# Patient Record
Sex: FEMALE | Race: WHITE | Employment: UNEMPLOYED | URBAN - METROPOLITAN AREA
[De-identification: names, ages, dates, MRNs, and addresses within clinical notes are randomized per-mention and may not be internally consistent; named-entity substitution may affect disease eponyms.]

---

## 2018-08-11 ENCOUNTER — HOSPITAL ENCOUNTER (EMERGENCY)
Age: 33
Discharge: HOME | End: 2018-08-12
Attending: EMERGENCY MEDICINE
Payer: MEDICAID

## 2018-08-11 ENCOUNTER — APPOINTMENT (OUTPATIENT)
Dept: GENERAL RADIOLOGY | Age: 33
End: 2018-08-11
Attending: EMERGENCY MEDICINE
Payer: MEDICAID

## 2018-08-11 DIAGNOSIS — S30.0XXA LUMBAR CONTUSION, INITIAL ENCOUNTER: Primary | ICD-10-CM

## 2018-08-11 PROCEDURE — 96372 THER/PROPH/DIAG INJ SC/IM: CPT | Performed by: EMERGENCY MEDICINE

## 2018-08-11 PROCEDURE — 74011250637 HC RX REV CODE- 250/637: Performed by: EMERGENCY MEDICINE

## 2018-08-11 PROCEDURE — 72100 X-RAY EXAM L-S SPINE 2/3 VWS: CPT

## 2018-08-11 PROCEDURE — 99283 EMERGENCY DEPT VISIT LOW MDM: CPT | Performed by: EMERGENCY MEDICINE

## 2018-08-11 PROCEDURE — 72220 X-RAY EXAM SACRUM TAILBONE: CPT

## 2018-08-11 PROCEDURE — 74011250636 HC RX REV CODE- 250/636: Performed by: EMERGENCY MEDICINE

## 2018-08-11 RX ORDER — KETOROLAC TROMETHAMINE 30 MG/ML
60 INJECTION, SOLUTION INTRAMUSCULAR; INTRAVENOUS
Status: COMPLETED | OUTPATIENT
Start: 2018-08-11 | End: 2018-08-11

## 2018-08-11 RX ORDER — METAXALONE 800 MG/1
800 TABLET ORAL ONCE
Status: COMPLETED | OUTPATIENT
Start: 2018-08-11 | End: 2018-08-11

## 2018-08-11 RX ADMIN — METAXALONE 800 MG: 800 TABLET ORAL at 23:53

## 2018-08-11 RX ADMIN — KETOROLAC TROMETHAMINE 60 MG: 30 INJECTION, SOLUTION INTRAMUSCULAR at 23:53

## 2018-08-12 VITALS
RESPIRATION RATE: 16 BRPM | OXYGEN SATURATION: 97 % | DIASTOLIC BLOOD PRESSURE: 74 MMHG | BODY MASS INDEX: 53.37 KG/M2 | HEIGHT: 62 IN | WEIGHT: 290 LBS | HEART RATE: 76 BPM | TEMPERATURE: 97.8 F | SYSTOLIC BLOOD PRESSURE: 136 MMHG

## 2018-08-12 RX ORDER — METHOCARBAMOL 750 MG/1
1500 TABLET, FILM COATED ORAL 4 TIMES DAILY
Qty: 40 TAB | Refills: 0 | Status: SHIPPED | OUTPATIENT
Start: 2018-08-12 | End: 2020-05-28

## 2018-08-12 RX ORDER — HYDROCODONE BITARTRATE AND ACETAMINOPHEN 7.5; 325 MG/1; MG/1
1 TABLET ORAL
Qty: 17 TAB | Refills: 0 | Status: SHIPPED | OUTPATIENT
Start: 2018-08-12 | End: 2020-05-28

## 2018-08-12 RX ORDER — NAPROXEN 500 MG/1
500 TABLET ORAL 2 TIMES DAILY WITH MEALS
Qty: 20 TAB | Refills: 0 | Status: SHIPPED | OUTPATIENT
Start: 2018-08-12 | End: 2020-05-28

## 2018-08-12 NOTE — ED PROVIDER NOTES
Patient is a 29 y.o. female presenting with fall. The history is provided by the patient. Fall   The accident occurred 2 days ago. Fall occurred: patient fell while trying to stand up from a kitchen stool. Reports that she slipped on some spilled cat food and landed directly on her lower back and coccyx region. She fell from a height of ground level. She landed on hard floor. There was no blood loss. Point of impact: llow back and buttocks. Pain location: llow back and buttocks. The pain is moderate. She was ambulatory at the scene. There was no entrapment after the fall. There was no drug use involved in the accident. There was no alcohol use involved in the accident. Pertinent negatives include no fever, no abdominal pain, no nausea, no vomiting, no headaches, no loss of consciousness and no laceration. Risk factors: oobesity. The symptoms are aggravated by activity. No past medical history on file. Past Surgical History:   Procedure Laterality Date    HX GYN      c section    HX HEENT           No family history on file. Social History     Social History    Marital status: SINGLE     Spouse name: N/A    Number of children: N/A    Years of education: N/A     Occupational History    Not on file. Social History Main Topics    Smoking status: Never Smoker    Smokeless tobacco: Not on file    Alcohol use No    Drug use: No    Sexual activity: Yes     Partners: Male     Other Topics Concern    Not on file     Social History Narrative         ALLERGIES: Morphine and Pcn [penicillins]    Review of Systems   Constitutional: Negative for chills and fever. HENT: Negative for congestion, ear pain and rhinorrhea. Eyes: Negative for photophobia and discharge. Respiratory: Negative for cough and shortness of breath. Cardiovascular: Negative for chest pain and palpitations. Gastrointestinal: Negative for abdominal pain, constipation, diarrhea, nausea and vomiting.    Endocrine: Negative for cold intolerance and heat intolerance. Genitourinary: Negative for dysuria and flank pain. Musculoskeletal: Positive for back pain. Negative for arthralgias, myalgias and neck pain. Skin: Negative for rash and wound. Allergic/Immunologic: Negative for environmental allergies and food allergies. Neurological: Negative for loss of consciousness, syncope and headaches. Hematological: Negative for adenopathy. Does not bruise/bleed easily. Psychiatric/Behavioral: Positive for dysphoric mood. The patient is nervous/anxious. All other systems reviewed and are negative. Vitals:    08/11/18 2316   BP: 139/76   Pulse: 81   Resp: 16   Temp: 97.8 °F (36.6 °C)   SpO2: 98%   Weight: 131.5 kg (290 lb)   Height: 5' 2\" (1.575 m)            Physical Exam   Constitutional: She is oriented to person, place, and time. She appears well-developed and well-nourished. She appears distressed. HENT:   Head: Normocephalic and atraumatic. Mouth/Throat: Oropharynx is clear and moist.   Eyes: Conjunctivae and EOM are normal. Pupils are equal, round, and reactive to light. Right eye exhibits no discharge. Left eye exhibits no discharge. No scleral icterus. Neck: Normal range of motion. Neck supple. No thyromegaly present. Cardiovascular: Normal rate, regular rhythm, normal heart sounds and intact distal pulses. Exam reveals no gallop and no friction rub. No murmur heard. Pulmonary/Chest: Effort normal and breath sounds normal. No respiratory distress. She has no wheezes. She exhibits no tenderness. Abdominal: Soft. Bowel sounds are normal. She exhibits no distension. There is no hepatosplenomegaly. There is no tenderness. There is no rebound and no guarding. No hernia. Musculoskeletal: Normal range of motion. She exhibits no edema or tenderness. Back:    Neurological: She is alert and oriented to person, place, and time. No cranial nerve deficit. She exhibits normal muscle tone.    Skin: Skin is warm and dry. No laceration and no rash noted. She is not diaphoretic. No erythema. Psychiatric: Her speech is normal and behavior is normal. Judgment and thought content normal. Her affect is blunt. Cognition and memory are normal.   Patient has a flat affect, is tearful   Nursing note and vitals reviewed. MDM  Number of Diagnoses or Management Options  Lumbar contusion, initial encounter: new and requires workup  Diagnosis management comments: Patient's controlled substance prescription records reviewed @ the [facility]  Aware website. Information will be utilized for accurate and appropriate patient care.     08/07/2018 2   06/05/2018 CLONAZEPAM 1 MG TABLET 60 30 JE ARACELI [id number] [facility]([id number]) 1 4.00 LME [place]   06/05/2018 2   06/05/2018 CLONAZEPAM 1 MG TABLET 60 30 JE ARACELI [id number] [facility]([id number]) 0 4.[place]   04/20/2018 2   03/16/2018 CLONAZEPAM 1 MG TABLET 60 30 KO GRA [id number] [facility]([id number]) 1 4.[place]   03/16/2018 2   03/16/2018 CLONAZEPAM 1 MG TABLET 60 30 KO GRA [id number] [facility]([id number]) 0 4.00 [place]   02/06/2018 2   12/01/2017 CLONAZEPAM 1 MG TABLET 60 30 KE CITLALI [id number] [facility]([id number]) 2 4.[place]   01/06/2018 2   12/01/2017 CLONAZEPAM 1 MG TABLET 60 30 KE CITLALI [id number] [facility]([id number]) 1 4.[place]   12/03/2017 2   12/01/2017 CLONAZEPAM 1 MG TABLET 60 30 KE CITLALI [id number] [facility]([id number]) 0 4.[place]   11/03/2017 2   08/11/2017 CLONAZEPAM 1 MG TABLET 60 30 PH STO [id number] [facility]([id number]) 2 4.[place]   10/01/2017 2   08/11/2017 CLONAZEPAM 1 MG TABLET 60 30 PH STO [id number] [facility]([id number]) 1 4.00 [place]   09/01/2017 2   08/11/2017 CLONAZEPAM 1 MG TABLET 60 30 PH STO [id number] [facility]([id number]) 0 4.00 [place]   07/29/2017 2   04/11/2017 CLONAZEPAM 1 MG TABLET 60 30 KE CITLALI [id number] [facility]([id number]) 2 4.00 [place]   06/12/2017 2   04/11/2017 CLONAZEPAM 1 MG TABLET 60 30 [facility] [id number] [facility]([id number]) 1 4.00 LME Medicaid     11:31 PM  Differential diagnosis  Vertebral fracture, contusion, muscle strength       Amount and/or Complexity of Data Reviewed  Tests in the radiology section of CPT®: ordered and reviewed  Tests in the medicine section of CPT®: reviewed and ordered  Review and summarize past medical records: yes    Risk of Complications, Morbidity, and/or Mortality  Presenting problems: moderate  Diagnostic procedures: low  Management options: low  General comments: Elements of this note have been dictated via voice recognition software. Text and phrases may be limited by the accuracy of the software. The chart has been reviewed, but errors may still be present.       Patient Progress  Patient progress: improved        ED Course       Procedures

## 2018-08-12 NOTE — ED NOTES
I have reviewed discharge instructions with the patient. The patient verbalized understanding. Patient left ED via Discharge Method: wheelchair to Home with ( family, self). Opportunity for questions and clarification provided. Patient given 3 scripts. To continue your aftercare when you leave the hospital, you may receive an automated call from our care team to check in on how you are doing. This is a free service and part of our promise to provide the best care and service to meet your aftercare needs.  If you have questions, or wish to unsubscribe from this service please call [phone]. Thank you for Choosing our [facility].

## 2018-08-12 NOTE — DISCHARGE INSTRUCTIONS
Low Back Contusion: Care Instructions  Your Care Instructions    Contusion is the medical term for a bruise. When you have a low back bruise, it's often caused by a direct blow or an impact, such as falling against a counter or table. Bruises are common sports injuries. Most people think of a bruise as a black-and-blue spot. This happens when small blood vessels get torn and leak blood under the skin. But bones, muscles, and organs can also get bruised. If these deep tissues are damaged, you may not always see a bruise. The doctor will examine your bruise. You may also have tests to make sure you do not have a more serious injury, such as a broken bone or nerve damage. Tests may include X-rays or other imaging tests like a CT scan or MRI. Low back bruises may cause pain and swelling. But if there is no serious damage, they will often get better with home treatment in several days to a few weeks. The doctor has checked you carefully, but problems can develop later. If you notice any problems or new symptoms, get medical treatment right away. Follow-up care is a key part of your treatment and safety. Be sure to make and go to all appointments, and call your doctor if you are having problems. It's also a good idea to know your test results and keep a list of the medicines you take. How can you care for yourself at home? · Put ice or a cold pack on the sore area for 10 to 20 minutes at a time to stop swelling. Put a thin cloth between the ice pack and your skin. · Be safe with medicines. Read and follow all instructions on the label. ¨ If the doctor gave you a prescription medicine for pain, take it as prescribed. ¨ If you are not taking a prescription pain medicine, ask your doctor if you can take an over-the-counter medicine. · For the first day or two of pain, take it easy. But as soon as possible, get back to your normal daily life and activities. · Get gentle exercise, such as walking.  Movement keeps your spine flexible and helps your muscles stay strong. When should you call for help? Call 911 anytime you think you may need emergency care. For example, call if:    · You are unable to move a leg at all.   [facility] your doctor now or seek immediate medical care if:    · You have new or worse symptoms in your legs or buttocks. Symptoms may include:  ¨ Numbness or tingling. ¨ Weakness. ¨ Pain.     · You lose bladder or bowel control.     · You have blood in your urine.    Watch closely for changes in your health, and be sure to contact your doctor if:    · You do not get better as expected. Where can you learn more? Go to http://genny-paras.info/. Enter V337 in the search box to learn more about \"Low Back Contusion: Care Instructions. \"  Current as of: November 20, 2017  Content Version: 11.7  © 8372-2861 [employer]. Care instructions adapted under license by [employer] (which disclaims liability or warranty for this information). If you have questions about a medical condition or this instruction, always ask your healthcare professional. [address] any warranty or liability for your use of this information.

## 2019-02-23 ENCOUNTER — HOSPITAL ENCOUNTER (EMERGENCY)
Age: 34
Discharge: HOME | End: 2019-02-23
Attending: EMERGENCY MEDICINE
Payer: MEDICAID

## 2019-02-23 ENCOUNTER — APPOINTMENT (OUTPATIENT)
Dept: CT IMAGING | Age: 34
End: 2019-02-23
Attending: EMERGENCY MEDICINE
Payer: MEDICAID

## 2019-02-23 ENCOUNTER — APPOINTMENT (OUTPATIENT)
Dept: ULTRASOUND IMAGING | Age: 34
End: 2019-02-23
Attending: EMERGENCY MEDICINE
Payer: MEDICAID

## 2019-02-23 VITALS
SYSTOLIC BLOOD PRESSURE: 145 MMHG | DIASTOLIC BLOOD PRESSURE: 95 MMHG | BODY MASS INDEX: 55.32 KG/M2 | HEIGHT: 61 IN | WEIGHT: 293 LBS | OXYGEN SATURATION: 99 % | RESPIRATION RATE: 16 BRPM | HEART RATE: 80 BPM | TEMPERATURE: 98.1 F

## 2019-02-23 DIAGNOSIS — R10.84 ABDOMINAL PAIN, GENERALIZED: Primary | ICD-10-CM

## 2019-02-23 LAB
ALBUMIN SERPL-MCNC: 3.7 G/DL (ref 3.5–5)
ALBUMIN/GLOB SERPL: 0.8 {RATIO}
ALP SERPL-CCNC: 132 U/L (ref 50–130)
ALT SERPL-CCNC: 20 U/L (ref 12–65)
ANION GAP SERPL CALC-SCNC: 6 MMOL/L
AST SERPL-CCNC: 19 U/L (ref 15–37)
BASOPHILS # BLD: 0 K/UL (ref 0–0.2)
BASOPHILS NFR BLD: 0 % (ref 0–2)
BILIRUB SERPL-MCNC: 0.4 MG/DL (ref 0.2–1.1)
BUN SERPL-MCNC: 9 MG/DL (ref 6–23)
CALCIUM SERPL-MCNC: 9 MG/DL (ref 8.3–10.4)
CHLORIDE SERPL-SCNC: 105 MMOL/L (ref 98–107)
CO2 SERPL-SCNC: 27 MMOL/L (ref 21–32)
CREAT SERPL-MCNC: 0.82 MG/DL (ref 0.6–1)
DIFFERENTIAL METHOD BLD: ABNORMAL
EOSINOPHIL # BLD: 0.1 K/UL (ref 0–0.8)
EOSINOPHIL NFR BLD: 1 % (ref 0.5–7.8)
ERYTHROCYTE [DISTWIDTH] IN BLOOD BY AUTOMATED COUNT: 15.2 % (ref 11.9–14.6)
GLOBULIN SER CALC-MCNC: 4.9 G/DL (ref 2.3–3.5)
GLUCOSE SERPL-MCNC: 106 MG/DL (ref 65–100)
HCG UR QL: NEGATIVE
HCT VFR BLD AUTO: 40.1 % (ref 35.8–46.3)
HGB BLD-MCNC: 12.4 G/DL (ref 11.7–15.4)
IMM GRANULOCYTES # BLD AUTO: 0.1 K/UL (ref 0–0.5)
IMM GRANULOCYTES NFR BLD AUTO: 1 % (ref 0–5)
LIPASE SERPL-CCNC: 139 U/L (ref 73–393)
LYMPHOCYTES # BLD: 1.8 K/UL (ref 0.5–4.6)
LYMPHOCYTES NFR BLD: 18 % (ref 13–44)
MCH RBC QN AUTO: 26.2 PG (ref 26.1–32.9)
MCHC RBC AUTO-ENTMCNC: 30.9 G/DL (ref 31.4–35)
MCV RBC AUTO: 84.6 FL (ref 79.6–97.8)
MONOCYTES # BLD: 0.5 K/UL (ref 0.1–1.3)
MONOCYTES NFR BLD: 6 % (ref 4–12)
NEUTS SEG # BLD: 7.2 K/UL (ref 1.7–8.2)
NEUTS SEG NFR BLD: 74 % (ref 43–78)
NRBC # BLD: 0 K/UL (ref 0–0.2)
PLATELET # BLD AUTO: 445 K/UL (ref 150–450)
PMV BLD AUTO: 10.2 FL (ref 9.4–12.3)
POTASSIUM SERPL-SCNC: 3.9 MMOL/L (ref 3.5–5.1)
PROT SERPL-MCNC: 8.6 G/DL
RBC # BLD AUTO: 4.74 M/UL (ref 4.05–5.2)
SODIUM SERPL-SCNC: 138 MMOL/L (ref 136–145)
WBC # BLD AUTO: 9.7 K/UL (ref 4.3–11.1)

## 2019-02-23 PROCEDURE — 74011000258 HC RX REV CODE- 258: Performed by: EMERGENCY MEDICINE

## 2019-02-23 PROCEDURE — 76856 US EXAM PELVIC COMPLETE: CPT

## 2019-02-23 PROCEDURE — 96375 TX/PRO/DX INJ NEW DRUG ADDON: CPT | Performed by: EMERGENCY MEDICINE

## 2019-02-23 PROCEDURE — 80053 COMPREHEN METABOLIC PANEL: CPT

## 2019-02-23 PROCEDURE — 99284 EMERGENCY DEPT VISIT MOD MDM: CPT | Performed by: EMERGENCY MEDICINE

## 2019-02-23 PROCEDURE — 81003 URINALYSIS AUTO W/O SCOPE: CPT | Performed by: EMERGENCY MEDICINE

## 2019-02-23 PROCEDURE — 74011250636 HC RX REV CODE- 250/636: Performed by: EMERGENCY MEDICINE

## 2019-02-23 PROCEDURE — 96374 THER/PROPH/DIAG INJ IV PUSH: CPT | Performed by: EMERGENCY MEDICINE

## 2019-02-23 PROCEDURE — 74011636320 HC RX REV CODE- 636/320: Performed by: EMERGENCY MEDICINE

## 2019-02-23 PROCEDURE — 81025 URINE PREGNANCY TEST: CPT

## 2019-02-23 PROCEDURE — 83690 ASSAY OF LIPASE: CPT

## 2019-02-23 PROCEDURE — 74177 CT ABD & PELVIS W/CONTRAST: CPT

## 2019-02-23 PROCEDURE — 85025 COMPLETE CBC W/AUTO DIFF WBC: CPT

## 2019-02-23 RX ORDER — SODIUM CHLORIDE 0.9 % (FLUSH) 0.9 %
10 SYRINGE (ML) INJECTION
Status: COMPLETED | OUTPATIENT
Start: 2019-02-23 | End: 2019-02-23

## 2019-02-23 RX ORDER — ONDANSETRON 2 MG/ML
4 INJECTION INTRAMUSCULAR; INTRAVENOUS
Status: COMPLETED | OUTPATIENT
Start: 2019-02-23 | End: 2019-02-23

## 2019-02-23 RX ORDER — POLYETHYLENE GLYCOL 3350 17 G/17G
17 POWDER, FOR SOLUTION ORAL DAILY
Qty: 17 G | Refills: 0 | Status: SHIPPED | OUTPATIENT
Start: 2019-02-23 | End: 2020-05-28

## 2019-02-23 RX ORDER — RANITIDINE 300 MG/1
300 TABLET ORAL DAILY
COMMUNITY
End: 2020-05-28

## 2019-02-23 RX ORDER — MONTELUKAST SODIUM 10 MG/1
10 TABLET ORAL DAILY
COMMUNITY
End: 2022-03-21

## 2019-02-23 RX ORDER — HYDROMORPHONE HYDROCHLORIDE 2 MG/ML
0.5 INJECTION, SOLUTION INTRAMUSCULAR; INTRAVENOUS; SUBCUTANEOUS
Status: COMPLETED | OUTPATIENT
Start: 2019-02-23 | End: 2019-02-23

## 2019-02-23 RX ADMIN — HYDROMORPHONE HYDROCHLORIDE 0.5 MG: 2 INJECTION, SOLUTION INTRAMUSCULAR; INTRAVENOUS; SUBCUTANEOUS at 14:53

## 2019-02-23 RX ADMIN — IOPAMIDOL 100 ML: 755 INJECTION, SOLUTION INTRAVENOUS at 14:11

## 2019-02-23 RX ADMIN — Medication 10 ML: at 14:11

## 2019-02-23 RX ADMIN — SODIUM CHLORIDE 100 ML: 900 INJECTION, SOLUTION INTRAVENOUS at 14:11

## 2019-02-23 RX ADMIN — ONDANSETRON 4 MG: 2 INJECTION INTRAMUSCULAR; INTRAVENOUS at 14:53

## 2019-02-23 RX ADMIN — SODIUM CHLORIDE 1000 ML: 900 INJECTION, SOLUTION INTRAVENOUS at 12:03

## 2019-02-23 NOTE — ED TRIAGE NOTES
Pt to ED c/o recent UTI that was treated with cipro. Started having abdominal pain on Thursday night and was told she was constipated when she went to [facility]. States she took mag citrate w/o relief. States no imaging studies at . Now having diarrhea. Hx of right ovarian cyst and a \"cyst on my colon. \" Denies fever but states chills since last night. Took 800mg ibuprofen at 1000.

## 2019-02-23 NOTE — ED PROVIDER NOTES
Patient is a 30 yo female with abdominal pain. States she is being treated for UTI and also constipation and took laxatives and states has been having loose bowel movements since however states continued abdominal pain and pressure. No vaginal bleeding or discharge, no nausea or vomiting, no fevers or chills, no further complaints. Past Medical History:  
Diagnosis Date  Anxiety  Depression  GERD (gastroesophageal reflux disease) Past Surgical History:  
Procedure Laterality Date  HX CHOLECYSTECTOMY  HX GYN    
 c section  HX HEENT History reviewed. No pertinent family history. Social History Socioeconomic History  Marital status: SINGLE Spouse name: Not on file  Number of children: Not on file  Years of education: Not on file  Highest education level: Not on file Social Needs  Financial resource strain: Not on file  Food insecurity - worry: Not on file  Food insecurity - inability: Not on file  Transportation needs - medical: Not on file  Transportation needs - non-medical: Not on file Occupational History  Not on file Tobacco Use  Smoking status: Never Smoker  Smokeless tobacco: Never Used Substance and Sexual Activity  Alcohol use: No  
 Drug use: No  
 Sexual activity: Yes  
  Partners: Male Other Topics Concern  Not on file Social History Narrative  Not on file ALLERGIES: Codeine; Keflex [cephalexin]; Morphine; and Pcn [penicillins] Review of Systems Constitutional: Negative for chills and fever. HENT: Negative for rhinorrhea and sore throat. Eyes: Negative for visual disturbance. Respiratory: Negative for cough and shortness of breath. Cardiovascular: Negative for chest pain and leg swelling. Gastrointestinal: Positive for abdominal pain and constipation. Negative for nausea and vomiting. Genitourinary: Negative for dysuria. Musculoskeletal: Negative for back pain and neck pain. Skin: Negative for rash. Neurological: Negative for weakness and headaches. Psychiatric/Behavioral: The patient is not nervous/anxious. Vitals:  
 02/23/19 1132 BP: (!) 149/108 Pulse: 83 Resp: 16 Temp: 97.8 °F (36.6 °C) SpO2: 99% Weight: 138.3 kg (305 lb) Height: 5' 1\" (1.549 m) Physical Exam  
Constitutional: She is oriented to person, place, and time. She appears well-developed and well-nourished. HENT:  
Head: Normocephalic. Right Ear: External ear normal.  
Left Ear: External ear normal.  
Eyes: Conjunctivae and EOM are normal. Pupils are equal, round, and reactive to light. Neck: Normal range of motion. Neck supple. No tracheal deviation present. Cardiovascular: Normal rate, regular rhythm, normal heart sounds and intact distal pulses. No murmur heard. Pulmonary/Chest: Effort normal and breath sounds normal. No respiratory distress. Abdominal: Soft. She exhibits no distension. There is tenderness (pain to palpation of LLQ/periumbilical region. ). There is no guarding. Musculoskeletal: Normal range of motion. Neurological: She is alert and oriented to person, place, and time. No cranial nerve deficit. Skin: No rash noted. Nursing note and vitals reviewed. MDM Number of Diagnoses or Management Options Amount and/or Complexity of Data Reviewed Clinical lab tests: reviewed and ordered Tests in the radiology section of CPT®: ordered and reviewed Tests in the medicine section of CPT®: ordered and reviewed Review and summarize past medical records: yes Risk of Complications, Morbidity, and/or Mortality Presenting problems: high Diagnostic procedures: high Management options: high Patient Progress Patient progress: stable Procedures Recent Results (from the past 12 hour(s)) CBC WITH AUTOMATED DIFF Collection Time: 02/23/19 12:41 PM  
Result Value Ref Range WBC 9.7 4.3 - 11.1 K/uL  
 RBC 4.74 4.05 - 5.2 M/uL  
 HGB 12.4 11.7 - 15.4 g/dL HCT 40.1 35.8 - 46.3 % MCV 84.6 79.6 - 97.8 FL  
 MCH 26.2 26.1 - 32.9 PG  
 MCHC 30.9 (L) 31.4 - 35.0 g/dL  
 RDW 15.2 (H) 11.9 - 14.6 % PLATELET 558 179 - 908 K/uL MPV 10.2 9.4 - 12.3 FL ABSOLUTE NRBC 0.00 0.0 - 0.2 K/uL  
 DF AUTOMATED NEUTROPHILS 74 43 - 78 % LYMPHOCYTES 18 13 - 44 % MONOCYTES 6 4.0 - 12.0 % EOSINOPHILS 1 0.5 - 7.8 % BASOPHILS 0 0.0 - 2.0 % IMMATURE GRANULOCYTES 1 0.0 - 5.0 %  
 ABS. NEUTROPHILS 7.2 1.7 - 8.2 K/UL  
 ABS. LYMPHOCYTES 1.8 0.5 - 4.6 K/UL  
 ABS. MONOCYTES 0.5 0.1 - 1.3 K/UL  
 ABS. EOSINOPHILS 0.1 0.0 - 0.8 K/UL  
 ABS. BASOPHILS 0.0 0.0 - 0.2 K/UL  
 ABS. IMM. GRANS. 0.1 0.0 - 0.5 K/UL METABOLIC PANEL, COMPREHENSIVE Collection Time: 02/23/19 12:41 PM  
Result Value Ref Range Sodium 138 136 - 145 mmol/L Potassium 3.9 3.5 - 5.1 mmol/L Chloride 105 98 - 107 mmol/L  
 CO2 27 21 - 32 mmol/L Anion gap 6 mmol/L Glucose 106 (H) 65 - 100 mg/dL BUN 9 6 - 23 MG/DL Creatinine 0.82 0.6 - 1.0 MG/DL  
 GFR est AA >60 >60 ml/min/1.73m2 GFR est non-AA >60 ml/min/1.73m2 Calcium 9.0 8.3 - 10.4 MG/DL Bilirubin, total 0.4 0.2 - 1.1 MG/DL  
 ALT (SGPT) 20 12 - 65 U/L  
 AST (SGOT) 19 15 - 37 U/L Alk. phosphatase 132 (H) 50 - 130 U/L Protein, total 8.6 g/dL Albumin 3.7 3.5 - 5.0 g/dL Globulin 4.9 (H) 2.3 - 3.5 g/dL A-G Ratio 0.8 LIPASE Collection Time: 02/23/19 12:41 PM  
Result Value Ref Range Lipase 139 73 - 393 U/L  
HCG URINE, QL. - POC Collection Time: 02/23/19  1:34 PM  
Result Value Ref Range Pregnancy test,urine (POC) NEGATIVE  NEG    
 
Ct Abd Pelv W Cont Result Date: 2/23/2019 CT OF THE ABDOMEN AND PELVIS WITH CONTRAST.  CLINICAL INDICATION: Lower abdominal pain since Thursday, diarrhea PROCEDURE: Serial thin section axial images obtained from the lung bases through the proximal femurs following the administration of 100 cc of Isovue 370 intravenous contrast.  Radiation dose reduction techniques were used for this study. Our CT scanners use one or all of the following: Automated exposure control, adjusted of the mA and/or kV according to patient size, iterative reconstruction COMPARISON: No prior FINDINGS: CT ABDOMEN: The liver, spleen, pancreas and bilateral kidneys are unremarkable. There is no hydronephrosis. Cholecystectomy clips are present. The adrenal glands are normal. The appendix is normal. 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 It is clearly separate from the appendix. CT PELVIS: A large fecal ball is present within the rectum. The remainder of the bowel is normal in course, caliber, and wall thickness. The uterus is anteverted and large. No adnexal mass appreciated. No aggressive osseous lesions or fractures identified. IMPRESSION: 1. Large fecal mass within the rectum. Fecal impaction suspected. 2. Large uterus without definite fibroids. No obvious adnexal pathology. Note if there is concern for pelvic viscera pathology given the patient has lower abdominal pain, the pelvic viscera could be more completely evaluated with pelvic ultrasound. 3. 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  
 
 
28 yo female with abdominal pain: 
 
  
3:29 PM Attempted manual disimpaction however with as far as my fingers could reach stool was soft and unable to obtain much stool at all. I discussed patient with Yana Henriquez on call for GI for Dr. Arturo Dash who suggest enema and they will see first thing in the office on Monday for work in appointment. Patient already tried mild of mag and colace so will attempt miralaz in addition to the enema.   Patient handed off to Dr. Joo Krueger pending result of pelvic US based on CT findings of enlarge ovaries/uterus and enema for further disposition at that time likely likely to home with follow up with GI if no further findings on CT and enema with success.

## 2020-05-28 ENCOUNTER — HOSPITAL ENCOUNTER (EMERGENCY)
Age: 35
Discharge: HOME | End: 2020-05-28
Attending: EMERGENCY MEDICINE
Payer: MEDICAID

## 2020-05-28 ENCOUNTER — APPOINTMENT (OUTPATIENT)
Dept: ULTRASOUND IMAGING | Age: 35
End: 2020-05-28
Attending: EMERGENCY MEDICINE
Payer: MEDICAID

## 2020-05-28 VITALS
TEMPERATURE: 98 F | HEIGHT: 62 IN | SYSTOLIC BLOOD PRESSURE: 160 MMHG | WEIGHT: 290 LBS | DIASTOLIC BLOOD PRESSURE: 80 MMHG | BODY MASS INDEX: 53.37 KG/M2 | HEART RATE: 98 BPM | RESPIRATION RATE: 18 BRPM | OXYGEN SATURATION: 100 %

## 2020-05-28 DIAGNOSIS — O03.9 MISCARRIAGE: Primary | ICD-10-CM

## 2020-05-28 LAB
ABO + RH BLD: NORMAL
ALBUMIN SERPL-MCNC: 3.2 G/DL (ref 3.5–5)
ALBUMIN/GLOB SERPL: 0.8 {RATIO} (ref 1.2–3.5)
ALP SERPL-CCNC: 95 U/L (ref 50–136)
ALT SERPL-CCNC: 26 U/L (ref 12–65)
ANION GAP SERPL CALC-SCNC: 8 MMOL/L (ref 7–16)
AST SERPL-CCNC: 31 U/L (ref 15–37)
BACTERIA URNS QL MICRO: NORMAL /HPF
BASOPHILS # BLD: 0 K/UL (ref 0–0.2)
BASOPHILS NFR BLD: 0 % (ref 0–2)
BILIRUB SERPL-MCNC: 0.3 MG/DL (ref 0.2–1.1)
BUN SERPL-MCNC: 8 MG/DL (ref 6–23)
CALCIUM SERPL-MCNC: 8.9 MG/DL (ref 8.3–10.4)
CASTS URNS QL MICRO: 0 /LPF
CHLORIDE SERPL-SCNC: 108 MMOL/L (ref 98–107)
CO2 SERPL-SCNC: 21 MMOL/L (ref 21–32)
CREAT SERPL-MCNC: 0.68 MG/DL (ref 0.6–1)
CRYSTALS URNS QL MICRO: 0 /LPF
DIFFERENTIAL METHOD BLD: ABNORMAL
EOSINOPHIL # BLD: 0.3 K/UL (ref 0–0.8)
EOSINOPHIL NFR BLD: 3 % (ref 0.5–7.8)
EPI CELLS #/AREA URNS HPF: NORMAL /HPF
ERYTHROCYTE [DISTWIDTH] IN BLOOD BY AUTOMATED COUNT: 17 % (ref 11.9–14.6)
GLOBULIN SER CALC-MCNC: 4.1 G/DL (ref 2.3–3.5)
GLUCOSE SERPL-MCNC: 100 MG/DL (ref 65–100)
HCG SERPL-ACNC: ABNORMAL MIU/ML (ref 0–6)
HCG UR QL: POSITIVE
HCT VFR BLD AUTO: 38 % (ref 35.8–46.3)
HGB BLD-MCNC: 12 G/DL (ref 11.7–15.4)
IMM GRANULOCYTES # BLD AUTO: 0 K/UL (ref 0–0.5)
IMM GRANULOCYTES NFR BLD AUTO: 0 % (ref 0–5)
LYMPHOCYTES # BLD: 2 K/UL (ref 0.5–4.6)
LYMPHOCYTES NFR BLD: 21 % (ref 13–44)
MCH RBC QN AUTO: 26.7 PG (ref 26.1–32.9)
MCHC RBC AUTO-ENTMCNC: 31.6 G/DL (ref 31.4–35)
MCV RBC AUTO: 84.4 FL (ref 79.6–97.8)
MONOCYTES # BLD: 0.7 K/UL (ref 0.1–1.3)
MONOCYTES NFR BLD: 7 % (ref 4–12)
MUCOUS THREADS URNS QL MICRO: 0 /LPF
NEUTS SEG # BLD: 6.7 K/UL (ref 1.7–8.2)
NEUTS SEG NFR BLD: 69 % (ref 43–78)
NRBC # BLD: 0 K/UL (ref 0–0.2)
OTHER OBSERVATIONS,UCOM: NORMAL
PLATELET # BLD AUTO: 363 K/UL (ref 150–450)
PLATELET COMMENTS,PCOM: ADEQUATE
PMV BLD AUTO: 11.5 FL (ref 9.4–12.3)
POTASSIUM SERPL-SCNC: 4.5 MMOL/L (ref 3.5–5.1)
PROT SERPL-MCNC: 7.3 G/DL (ref 6.3–8.2)
RBC # BLD AUTO: 4.5 M/UL (ref 4.05–5.2)
RBC #/AREA URNS HPF: >100 /HPF
RBC MORPH BLD: ABNORMAL
SODIUM SERPL-SCNC: 137 MMOL/L (ref 136–145)
WBC # BLD AUTO: 9.7 K/UL (ref 4.3–11.1)
WBC MORPH BLD: ABNORMAL
WBC URNS QL MICRO: NORMAL /HPF

## 2020-05-28 PROCEDURE — 81003 URINALYSIS AUTO W/O SCOPE: CPT

## 2020-05-28 PROCEDURE — 81015 MICROSCOPIC EXAM OF URINE: CPT

## 2020-05-28 PROCEDURE — 84702 CHORIONIC GONADOTROPIN TEST: CPT

## 2020-05-28 PROCEDURE — 99284 EMERGENCY DEPT VISIT MOD MDM: CPT

## 2020-05-28 PROCEDURE — 76815 OB US LIMITED FETUS(S): CPT

## 2020-05-28 PROCEDURE — 86900 BLOOD TYPING SEROLOGIC ABO: CPT

## 2020-05-28 PROCEDURE — 85025 COMPLETE CBC W/AUTO DIFF WBC: CPT

## 2020-05-28 PROCEDURE — 81025 URINE PREGNANCY TEST: CPT

## 2020-05-28 PROCEDURE — 80053 COMPREHEN METABOLIC PANEL: CPT

## 2020-05-28 RX ORDER — HYDROCODONE BITARTRATE AND ACETAMINOPHEN 5; 325 MG/1; MG/1
1 TABLET ORAL
Qty: 12 TAB | Refills: 0 | Status: SHIPPED | OUTPATIENT
Start: 2020-05-28 | End: 2020-05-31

## 2020-05-28 RX ORDER — ACETAMINOPHEN 500 MG
2000 TABLET ORAL DAILY
COMMUNITY
Start: 2020-03-20

## 2020-05-28 RX ORDER — FLUOXETINE HYDROCHLORIDE 20 MG/1
20 CAPSULE ORAL DAILY
COMMUNITY
Start: 2020-03-20 | End: 2021-03-20

## 2020-05-28 RX ORDER — ALBUTEROL SULFATE 90 UG/1
2 AEROSOL, METERED RESPIRATORY (INHALATION)
COMMUNITY
Start: 2020-03-20

## 2020-05-28 RX ORDER — ONDANSETRON 8 MG/1
8 TABLET, ORALLY DISINTEGRATING ORAL
Qty: 8 TAB | Refills: 1 | Status: SHIPPED | OUTPATIENT
Start: 2020-05-28

## 2020-05-28 NOTE — ED TRIAGE NOTES
Pt c/o lower abdominal cramping and vaginal bleeding that started today. Pt states she is 7 weeks pregnant. Pt denies passing any blood clots. Pt states she called her OB and they were unable to see her today. Pt masked upon arrival to the ED.

## 2020-05-28 NOTE — ED PROVIDER NOTES
Here with BRB vaginal bleeding. This began today. She has passed clots and has noticed no formed tissue. She had first day of her last normal menstrual period as April 12. She has had 3 prior pregnancies to term. She has had no miscarriages or elective abortions. Has history of PCOS ovarian cysts. She states this pain is different than those pains. Main other issue is weight related. The history is provided by the patient. Threatened Miscarriage   Primary symptoms include vaginal bleeding. There has been no fever. This is a new problem. The problem has not changed since onset. Pertinent negatives include no abdominal pain, no flank pain and no fever. Associated medical issues include miscarriage. Past Medical History:   Diagnosis Date    Anxiety     Depression     GERD (gastroesophageal reflux disease)        Past Surgical History:   Procedure Laterality Date    HX CHOLECYSTECTOMY      HX GYN      c section    HX HEENT           History reviewed. No pertinent family history.     Social History     Socioeconomic History    Marital status: SINGLE     Spouse name: Not on file    Number of children: Not on file    Years of education: Not on file    Highest education level: Not on file   Occupational History    Not on file   Social Needs    Financial resource strain: Not on file    Food insecurity     Worry: Not on file     Inability: Not on file    Transportation needs     Medical: Not on file     Non-medical: Not on file   Tobacco Use    Smoking status: Never Smoker    Smokeless tobacco: Never Used   Substance and Sexual Activity    Alcohol use: No    Drug use: No    Sexual activity: Yes     Partners: Male   Lifestyle    Physical activity     Days per week: Not on file     Minutes per session: Not on file    Stress: Not on file   Relationships    Social connections     Talks on phone: Not on file     Gets together: Not on file     Attends Church service: Not on file     Active member of club or organization: Not on file     Attends meetings of clubs or organizations: Not on file     Relationship status: Not on file    Intimate partner violence     Fear of current or ex partner: Not on file     Emotionally abused: Not on file     Physically abused: Not on file     Forced sexual activity: Not on file   Other Topics Concern    Not on file   Social History Narrative    Not on file         ALLERGIES: Codeine; Keflex [cephalexin]; Morphine; and Pcn [penicillins]    Review of Systems   Constitutional: Negative for chills and fever. HENT: Negative. Respiratory: Negative for cough and shortness of breath. Cardiovascular: Negative for chest pain. Gastrointestinal: Negative for abdominal pain. Genitourinary: Positive for vaginal bleeding. Negative for flank pain, hematuria, vaginal discharge and vaginal pain. Musculoskeletal: Negative. Neurological: Negative. Psychiatric/Behavioral: Negative. All other systems reviewed and are negative. Vitals:    05/28/20 1532   BP: 168/88   Pulse: 98   Resp: 17   Temp: 98.2 °F (36.8 °C)   SpO2: 100%   Weight: 131.5 kg (290 lb)   Height: 5' 1.5\" (1.562 m)            Physical Exam  Vitals signs and nursing note reviewed. Constitutional:       General: She is not in acute distress. Appearance: Normal appearance. She is well-developed. She is obese. She is not ill-appearing or diaphoretic. Comments: Capable, relaxed comfortable and stable vital signs   HENT:      Head: Atraumatic. Nose: Nose normal.      Mouth/Throat:      Mouth: Mucous membranes are moist.   Eyes:      General: No scleral icterus. Neck:      Musculoskeletal: Neck supple. Cardiovascular:      Rate and Rhythm: Normal rate. Pulmonary:      Effort: Pulmonary effort is normal. No respiratory distress. Abdominal:      General: Bowel sounds are normal.      Palpations: Abdomen is soft. Tenderness: There is no abdominal tenderness.  There is no guarding or rebound. Musculoskeletal:      Right lower leg: No edema. Left lower leg: No edema. Skin:     General: Skin is warm and dry. Neurological:      General: No focal deficit present. Psychiatric:         Thought Content: Thought content normal.          MDM  Number of Diagnoses or Management Options  Miscarriage:   Diagnosis management comments: Seen in the emergency room by [place] hospitalist who feels as though she has probably miscarried and does not feel as though she has an ectopic. She has personally reviewed ultrasound and lab work examined patient. Patient at present is to follow-up with OB on Monday. She is aware she should return with any worsening. Amount and/or Complexity of Data Reviewed  Clinical lab tests: ordered and reviewed  Tests in the radiology section of CPT®: reviewed and ordered  Independent visualization of images, tracings, or specimens: yes    Risk of Complications, Morbidity, and/or Mortality  Presenting problems: moderate  Diagnostic procedures: low  Management options: moderate  General comments: Has to a large organized structured piece of tissue that is consistent with products of conception when she went to the bathroom after intravaginal ultrasound    Patient Progress  Patient progress: stable         Procedures  Recent Results (from the past 12 hour(s))   CBC WITH AUTOMATED DIFF    Collection Time: 05/28/20  3:39 PM   Result Value Ref Range    WBC 9.7 4.3 - 11.1 K/uL    RBC 4.50 4.05 - 5.2 M/uL    HGB 12.0 11.7 - 15.4 g/dL    HCT 38.0 35.8 - 46.3 %    MCV 84.4 79.6 - 97.8 FL    MCH 26.7 26.1 - 32.9 PG    MCHC 31.6 31.4 - 35.0 g/dL    RDW 17.0 (H) 11.9 - 14.6 %    PLATELET 009 429 - 458 K/uL    MPV 11.5 9.4 - 12.3 FL    ABSOLUTE NRBC 0.00 0.0 - 0.2 K/uL    NEUTROPHILS 69 43 - 78 %    LYMPHOCYTES 21 13 - 44 %    MONOCYTES 7 4.0 - 12.0 %    EOSINOPHILS 3 0.5 - 7.8 %    BASOPHILS 0 0.0 - 2.0 %    IMMATURE GRANULOCYTES 0 0.0 - 5.0 %    ABS.  NEUTROPHILS 6.7 1.7 - 8.2 K/UL    ABS. LYMPHOCYTES 2.0 0.5 - 4.6 K/UL    ABS. MONOCYTES 0.7 0.1 - 1.3 K/UL    ABS. EOSINOPHILS 0.3 0.0 - 0.8 K/UL    ABS. BASOPHILS 0.0 0.0 - 0.2 K/UL    ABS. IMM. GRANS. 0.0 0.0 - 0.5 K/UL    RBC COMMENTS NORMOCYTIC/NORMOCHROMIC      WBC COMMENTS Result Confirmed By Smear      PLATELET COMMENTS ADEQUATE      DF AUTOMATED     METABOLIC PANEL, COMPREHENSIVE    Collection Time: 05/28/20  3:39 PM   Result Value Ref Range    Sodium 137 136 - 145 mmol/L    Potassium 4.5 3.5 - 5.1 mmol/L    Chloride 108 (H) 98 - 107 mmol/L    CO2 21 21 - 32 mmol/L    Anion gap 8 7 - 16 mmol/L    Glucose 100 65 - 100 mg/dL    BUN 8 6 - 23 MG/DL    Creatinine 0.68 0.6 - 1.0 MG/DL    GFR est AA >60 >60 ml/min/1.73m2    GFR est non-AA >60 >60 ml/min/1.73m2    Calcium 8.9 8.3 - 10.4 MG/DL    Bilirubin, total 0.3 0.2 - 1.1 MG/DL    ALT (SGPT) 26 12 - 65 U/L    AST (SGOT) 31 15 - 37 U/L    Alk. phosphatase 95 50 - 136 U/L    Protein, total 7.3 6.3 - 8.2 g/dL    Albumin 3.2 (L) 3.5 - 5.0 g/dL    Globulin 4.1 (H) 2.3 - 3.5 g/dL    A-G Ratio 0.8 (L) 1.2 - 3.5     BETA HCG, QT    Collection Time: 05/28/20  3:39 PM   Result Value Ref Range    Beta HCG, QT 17,431 (H) 0.0 - 6.0 MIU/ML   BLOOD TYPE, (ABO+RH)    Collection Time: 05/28/20  3:39 PM   Result Value Ref Range    ABO/Rh(D) O POSITIVE    URINE MICROSCOPIC    Collection Time: 05/28/20  4:10 PM   Result Value Ref Range    WBC 3-5 0 /hpf    RBC >100 0 /hpf    Epithelial cells 0-3 0 /hpf    Bacteria TRACE 0 /hpf    Casts 0 0 /lpf    Crystals, urine 0 0 /LPF    Mucus 0 0 /lpf    Other observations       MICROSCOPIC PERFORMED ON UNSPUN URINE DUE TO TOO MANY CELLS   HCG URINE, QL. - POC    Collection Time: 05/28/20  4:15 PM   Result Value Ref Range    Pregnancy test,urine (POC) Positive (A) NEG           Final [99] 5/28/2020 18:01 5/28/2020 18:32   Study Result     PELVIC ULTRASOUND.     HISTORY: Vaginal bleeding.   HCG 17,400+.     TECHNIQUE: Transabdominal sector images through a urine distended bladder and  high resolution endovaginal images of the pelvis.     FINDINGS: Urinary bladder is quite underdistended.     Uterus measures 13.1 cm x 6.9 cm x 8.5 cm. Gestational sac not identified within  the uterus. Fluid in the cul-de-sac .     Right ovary: Not identified     Left ovary: Not identified      IMPRESSION  IMPRESSION: Limited exam due to underdistention of the urinary bladder. Ovaries  not identified. No intrauterine pregnancy. Small cystlike fluid collection near  the uterus, 16 x 38 mm.  Ectopic

## 2020-05-29 NOTE — ED NOTES
I have reviewed discharge instructions with the patient. The patient verbalized understanding. Patient left ED via Discharge Method: ambulatory to Home with spouse. Opportunity for questions and clarification provided. Patient given 2 scripts. To continue your aftercare when you leave the hospital, you may receive an automated call from our care team to check in on how you are doing. This is a free service and part of our promise to provide the best care and service to meet your aftercare needs.  If you have questions, or wish to unsubscribe from this service please call [phone]. Thank you for Choosing our [facility] Emergency Department.

## 2020-05-29 NOTE — CONSULTS
Gynecology Consult    Name: Christiano Sandoval MRN: [id number] SSN: [id number]    YOB: 1985  Age: 36 y.o. Sex: female       Subjective:      Chief complaint:    Vaginal bleeding during pregnancy    Salina Jose is a 36 y.o.  who is approximately 7 weeks pregnancy (known lmp/ trying to conceive) presented to ED complaining of vaginal bleeding and cramping that started at 14:00. No pain prior to then. Pt reports that she has passed a lot of blood and passed a large clot that looked like tissue. That occurred while she was in the ED. Pt reports history of 3 uncomplicated deliveries. No hx of pid or smoking. Past Medical History:   Diagnosis Date    Anxiety     Depression     GERD (gastroesophageal reflux disease)      Past Surgical History:   Procedure Laterality Date    HX CHOLECYSTECTOMY      HX GYN      c section    HX HEENT       OB History    No obstetric history on file. Allergies   Allergen Reactions    Codeine Unknown (comments)    Keflex [Cephalexin] Nausea and Vomiting    Morphine Other (comments)     \"burn all over\"    Pcn [Penicillins] Rash     Current Outpatient Medications   Medication Sig Dispense Refill    albuterol (PROVENTIL HFA, VENTOLIN HFA, PROAIR HFA) 90 mcg/actuation inhaler Take 2 Puffs by inhalation every four (4) hours as needed.  cholecalciferol (VITAMIN D3) (2,000 UNITS /50 MCG) cap capsule Take 2,000 Units by mouth daily.  FLUoxetine (PROzac) 20 mg capsule Take 20 mg by mouth daily.  montelukast (SINGULAIR) 10 mg tablet Take 10 mg by mouth daily.  clonazePAM (KLONOPIN) 1 mg tablet Take  by mouth two (2) times a day. Indications: PANIC DISORDER         History reviewed. No pertinent family history.   Social History     Socioeconomic History    Marital status: SINGLE     Spouse name: Not on file    Number of children: Not on file    Years of education: Not on file    Highest education level: Not on file   Occupational History    Not on file   Social Needs    Financial resource strain: Not on file    Food insecurity     Worry: Not on file     Inability: Not on file    Transportation needs     Medical: Not on file     Non-medical: Not on file   Tobacco Use    Smoking status: Never Smoker    Smokeless tobacco: Never Used   Substance and Sexual Activity    Alcohol use: No    Drug use: No    Sexual activity: Yes     Partners: Male   Lifestyle    Physical activity     Days per week: Not on file     Minutes per session: Not on file    Stress: Not on file   Relationships    Social connections     Talks on phone: Not on file     Gets together: Not on file     Attends Jehovah's witness service: Not on file     Active member of club or organization: Not on file     Attends meetings of clubs or organizations: Not on file     Relationship status: Not on file    Intimate partner violence     Fear of current or ex partner: Not on file     Emotionally abused: Not on file     Physically abused: Not on file     Forced sexual activity: Not on file   Other Topics Concern    Not on file   Social History Narrative    Not on file       Review of Systems:  A comprehensive review of systems was negative except for that written in the History of Present Illness. Objective:     Vitals:    05/28/20 1532   BP: 168/88   Pulse: 98   Resp: 17   Temp: 98.2 °F (36.8 °C)   SpO2: 100%   Weight: 131.5 kg (290 lb)   Height: 5' 1.5\" (1.562 m)       General:  alert, cooperative, no distress, appears stated age   Skin:  Normal. and no rash or abnormalities   Eyes: negative               Heart:  regular rate and rhythm, S1, S2 normal, no murmur, click, rub or gallop   Abdomen: soft, non-tender.  Bowel sounds normal. No masses,  no organomegaly   CVA:  absent   Genitourinary: Bi matteo exam- cervix slightly open - less than 1 cm minimal bleeding; discomfort bilat adnexa with deep palpation, exam limited by body habitus   Extremities:  extremities normal, atraumatic, no cyanosis or edema   Neurologic:  negative   Psychiatric:  appropriate     Recent Results (from the past 12 hour(s))   CBC WITH AUTOMATED DIFF    Collection Time: 05/28/20  3:39 PM   Result Value Ref Range    WBC 9.7 4.3 - 11.1 K/uL    RBC 4.50 4.05 - 5.2 M/uL    HGB 12.0 11.7 - 15.4 g/dL    HCT 38.0 35.8 - 46.3 %    MCV 84.4 79.6 - 97.8 FL    MCH 26.7 26.1 - 32.9 PG    MCHC 31.6 31.4 - 35.0 g/dL    RDW 17.0 (H) 11.9 - 14.6 %    PLATELET 593 858 - 384 K/uL    MPV 11.5 9.4 - 12.3 FL    ABSOLUTE NRBC 0.00 0.0 - 0.2 K/uL    NEUTROPHILS 69 43 - 78 %    LYMPHOCYTES 21 13 - 44 %    MONOCYTES 7 4.0 - 12.0 %    EOSINOPHILS 3 0.5 - 7.8 %    BASOPHILS 0 0.0 - 2.0 %    IMMATURE GRANULOCYTES 0 0.0 - 5.0 %    ABS. NEUTROPHILS 6.7 1.7 - 8.2 K/UL    ABS. LYMPHOCYTES 2.0 0.5 - 4.6 K/UL    ABS. MONOCYTES 0.7 0.1 - 1.3 K/UL    ABS. EOSINOPHILS 0.3 0.0 - 0.8 K/UL    ABS. BASOPHILS 0.0 0.0 - 0.2 K/UL    ABS. IMM. GRANS. 0.0 0.0 - 0.5 K/UL    RBC COMMENTS NORMOCYTIC/NORMOCHROMIC      WBC COMMENTS Result Confirmed By Smear      PLATELET COMMENTS ADEQUATE      DF AUTOMATED     METABOLIC PANEL, COMPREHENSIVE    Collection Time: 05/28/20  3:39 PM   Result Value Ref Range    Sodium 137 136 - 145 mmol/L    Potassium 4.5 3.5 - 5.1 mmol/L    Chloride 108 (H) 98 - 107 mmol/L    CO2 21 21 - 32 mmol/L    Anion gap 8 7 - 16 mmol/L    Glucose 100 65 - 100 mg/dL    BUN 8 6 - 23 MG/DL    Creatinine 0.68 0.6 - 1.0 MG/DL    GFR est AA >60 >60 ml/min/1.73m2    GFR est non-AA >60 >60 ml/min/1.73m2    Calcium 8.9 8.3 - 10.4 MG/DL    Bilirubin, total 0.3 0.2 - 1.1 MG/DL    ALT (SGPT) 26 12 - 65 U/L    AST (SGOT) 31 15 - 37 U/L    Alk.  phosphatase 95 50 - 136 U/L    Protein, total 7.3 6.3 - 8.2 g/dL    Albumin 3.2 (L) 3.5 - 5.0 g/dL    Globulin 4.1 (H) 2.3 - 3.5 g/dL    A-G Ratio 0.8 (L) 1.2 - 3.5     BETA HCG, QT    Collection Time: 05/28/20  3:39 PM   Result Value Ref Range    Beta HCG, QT 17,431 (H) 0.0 - 6.0 MIU/ML   BLOOD TYPE, (ABO+RH)    Collection Time: 05/28/20 3:39 PM   Result Value Ref Range    ABO/Rh(D) O POSITIVE    URINE MICROSCOPIC    Collection Time: 05/28/20  4:10 PM   Result Value Ref Range    WBC 3-5 0 /hpf    RBC >100 0 /hpf    Epithelial cells 0-3 0 /hpf    Bacteria TRACE 0 /hpf    Casts 0 0 /lpf    Crystals, urine 0 0 /LPF    Mucus 0 0 /lpf    Other observations       MICROSCOPIC PERFORMED ON UNSPUN URINE DUE TO TOO MANY CELLS   HCG URINE, QL. - POC    Collection Time: 05/28/20  4:15 PM   Result Value Ref Range    Pregnancy test,urine (POC) Positive (A) NEG       [employer]    Result Date: 5/28/2020  IMPRESSION: Limited exam due to underdistention of the urinary bladder. Ovaries not identified. No intrauterine pregnancy. Small cystlike fluid collection near the uterus, 16 x 38 mm. Ectopic pregnancy cannot be excluded. Assessment:     SAB- spontaneous complete first trimester loss      Plan:     Discussed with pt that this is most likely a spontaneous complete SAB. It could possibly be incomplete AB but unlikely based on ultrasound finding and current exam  Could possibly be ectopic but this is very unlikely based on physical exam and description of pain and ultrasound findings. I personally reviewed ultrasound with ultrasound tech. Offered pt to stay overnight in hospital and repeat labs and exam in am. Pt declines. Her  will be at home with her tonight and will return for any problems. Pt to follow up with [address] [place] [address] [address] [facility] next week. Pt is without pain at time of discharge. Afua Greer

## 2020-05-29 NOTE — DISCHARGE INSTRUCTIONS
RETURN/RECHECK AT ANY TIME WITH: WORSENING PAIN/ BLEEDING/FEVER/ FAINTNESS    SEEN IN ER BY DR Colton Peralta

## 2020-06-02 PROBLEM — E66.01 OBESITY, MORBID (HCC): Status: ACTIVE | Noted: 2020-06-02

## 2020-11-12 ENCOUNTER — HOSPITAL ENCOUNTER (EMERGENCY)
Age: 35
Discharge: HOME | End: 2020-11-12
Attending: EMERGENCY MEDICINE
Payer: MEDICAID

## 2020-11-12 ENCOUNTER — APPOINTMENT (OUTPATIENT)
Dept: CT IMAGING | Age: 35
End: 2020-11-12
Attending: EMERGENCY MEDICINE
Payer: MEDICAID

## 2020-11-12 VITALS
HEART RATE: 79 BPM | SYSTOLIC BLOOD PRESSURE: 140 MMHG | HEIGHT: 61 IN | RESPIRATION RATE: 16 BRPM | BODY MASS INDEX: 50.98 KG/M2 | OXYGEN SATURATION: 99 % | TEMPERATURE: 98.1 F | WEIGHT: 270 LBS | DIASTOLIC BLOOD PRESSURE: 85 MMHG

## 2020-11-12 DIAGNOSIS — R10.10 UPPER ABDOMINAL PAIN: Primary | ICD-10-CM

## 2020-11-12 LAB
ALBUMIN SERPL-MCNC: 3.5 G/DL (ref 3.5–5)
ALBUMIN/GLOB SERPL: 0.8 {RATIO} (ref 1.2–3.5)
ALP SERPL-CCNC: 103 U/L (ref 50–130)
ALT SERPL-CCNC: 22 U/L (ref 12–65)
ANION GAP SERPL CALC-SCNC: 4 MMOL/L (ref 7–16)
AST SERPL-CCNC: 16 U/L (ref 15–37)
BASOPHILS # BLD: 0.1 K/UL (ref 0–0.2)
BASOPHILS NFR BLD: 1 % (ref 0–2)
BILIRUB SERPL-MCNC: 0.3 MG/DL (ref 0.2–1.1)
BUN SERPL-MCNC: 9 MG/DL (ref 6–23)
CALCIUM SERPL-MCNC: 8.8 MG/DL (ref 8.3–10.4)
CHLORIDE SERPL-SCNC: 107 MMOL/L (ref 98–107)
CO2 SERPL-SCNC: 28 MMOL/L (ref 21–32)
CREAT SERPL-MCNC: 0.82 MG/DL (ref 0.6–1)
DIFFERENTIAL METHOD BLD: ABNORMAL
EOSINOPHIL # BLD: 0.1 K/UL (ref 0–0.8)
EOSINOPHIL NFR BLD: 2 % (ref 0.5–7.8)
ERYTHROCYTE [DISTWIDTH] IN BLOOD BY AUTOMATED COUNT: 16 % (ref 11.9–14.6)
GLOBULIN SER CALC-MCNC: 4.6 G/DL (ref 2.3–3.5)
GLUCOSE SERPL-MCNC: 91 MG/DL (ref 65–100)
HCG UR QL: NEGATIVE
HCT VFR BLD AUTO: 39 % (ref 35.8–46.3)
HGB BLD-MCNC: 12.2 G/DL (ref 11.7–15.4)
IMM GRANULOCYTES # BLD AUTO: 0 K/UL (ref 0–0.5)
IMM GRANULOCYTES NFR BLD AUTO: 0 % (ref 0–5)
LIPASE SERPL-CCNC: 194 U/L (ref 73–393)
LYMPHOCYTES # BLD: 2.7 K/UL (ref 0.5–4.6)
LYMPHOCYTES NFR BLD: 28 % (ref 13–44)
MCH RBC QN AUTO: 26.6 PG (ref 26.1–32.9)
MCHC RBC AUTO-ENTMCNC: 31.3 G/DL (ref 31.4–35)
MCV RBC AUTO: 85 FL (ref 79.6–97.8)
MONOCYTES # BLD: 0.6 K/UL (ref 0.1–1.3)
MONOCYTES NFR BLD: 7 % (ref 4–12)
NEUTS SEG # BLD: 6 K/UL (ref 1.7–8.2)
NEUTS SEG NFR BLD: 63 % (ref 43–78)
NRBC # BLD: 0 K/UL (ref 0–0.2)
PLATELET # BLD AUTO: 467 K/UL (ref 150–450)
PMV BLD AUTO: 10.9 FL (ref 9.4–12.3)
POTASSIUM SERPL-SCNC: 3.9 MMOL/L (ref 3.5–5.1)
PROT SERPL-MCNC: 8.1 G/DL (ref 6.3–8.2)
RBC # BLD AUTO: 4.59 M/UL (ref 4.05–5.2)
SODIUM SERPL-SCNC: 139 MMOL/L (ref 136–145)
WBC # BLD AUTO: 9.5 K/UL (ref 4.3–11.1)

## 2020-11-12 PROCEDURE — 74011000636 HC RX REV CODE- 636: Performed by: EMERGENCY MEDICINE

## 2020-11-12 PROCEDURE — 83690 ASSAY OF LIPASE: CPT

## 2020-11-12 PROCEDURE — 74177 CT ABD & PELVIS W/CONTRAST: CPT

## 2020-11-12 PROCEDURE — 85025 COMPLETE CBC W/AUTO DIFF WBC: CPT

## 2020-11-12 PROCEDURE — 74011250637 HC RX REV CODE- 250/637: Performed by: EMERGENCY MEDICINE

## 2020-11-12 PROCEDURE — 80053 COMPREHEN METABOLIC PANEL: CPT

## 2020-11-12 PROCEDURE — 99284 EMERGENCY DEPT VISIT MOD MDM: CPT

## 2020-11-12 PROCEDURE — 74011000258 HC RX REV CODE- 258: Performed by: EMERGENCY MEDICINE

## 2020-11-12 PROCEDURE — 81025 URINE PREGNANCY TEST: CPT

## 2020-11-12 RX ORDER — HYOSCYAMINE SULFATE 0.12 MG/1
0.12 TABLET SUBLINGUAL
Qty: 20 TAB | Refills: 1 | Status: SHIPPED | OUTPATIENT
Start: 2020-11-12 | End: 2022-03-21

## 2020-11-12 RX ORDER — HYOSCYAMINE SULFATE 0.12 MG/1
0.12 TABLET SUBLINGUAL
Status: COMPLETED | OUTPATIENT
Start: 2020-11-12 | End: 2020-11-12

## 2020-11-12 RX ORDER — PANTOPRAZOLE SODIUM 40 MG/1
40 TABLET, DELAYED RELEASE ORAL
Status: COMPLETED | OUTPATIENT
Start: 2020-11-12 | End: 2020-11-12

## 2020-11-12 RX ORDER — SODIUM CHLORIDE 0.9 % (FLUSH) 0.9 %
10 SYRINGE (ML) INJECTION
Status: COMPLETED | OUTPATIENT
Start: 2020-11-12 | End: 2020-11-12

## 2020-11-12 RX ADMIN — IOPAMIDOL 100 ML: 755 INJECTION, SOLUTION INTRAVENOUS at 20:26

## 2020-11-12 RX ADMIN — PANTOPRAZOLE SODIUM 40 MG: 40 TABLET, DELAYED RELEASE ORAL at 22:15

## 2020-11-12 RX ADMIN — SODIUM CHLORIDE 100 ML: 900 INJECTION, SOLUTION INTRAVENOUS at 20:26

## 2020-11-12 RX ADMIN — Medication 10 ML: at 20:26

## 2020-11-12 RX ADMIN — HYOSCYAMINE SULFATE 0.12 MG: 0.12 TABLET ORAL; SUBLINGUAL at 22:15

## 2020-11-13 NOTE — ED PROVIDER NOTES
With right upper quadrant pain she states that this is very similar to her gallbladder pain in the past.  She had a cholecystectomy about 3 years ago. She has never had a history of pancreatitis. Pain was originally 3 or 4 on her scale and states it is almost a 10 at this point. Denies any history of ulcer disease or alcohol abuse or pancreatitis. With this she has had no cough or sputum change. The history is provided by the patient. Abdominal Pain    This is a new problem. The pain is associated with an unknown factor. The pain is located in the RUQ and generalized abdominal region. The pain is moderate. Pertinent negatives include no fever, no hematochezia, no melena, no nausea, no vomiting and no constipation. Nothing worsens the pain. The pain is relieved by nothing. The patient's surgical history includes cholecystectomy. Past Medical History:   Diagnosis Date    Anxiety     Depression     GERD (gastroesophageal reflux disease)        Past Surgical History:   Procedure Laterality Date    HX CHOLECYSTECTOMY      HX GYN      c section    HX HEENT           History reviewed. No pertinent family history.     Social History     Socioeconomic History    Marital status: SINGLE     Spouse name: Not on file    Number of children: Not on file    Years of education: Not on file    Highest education level: Not on file   Occupational History    Not on file   Social Needs    Financial resource strain: Not on file    Food insecurity     Worry: Not on file     Inability: Not on file    Transportation needs     Medical: Not on file     Non-medical: Not on file   Tobacco Use    Smoking status: Never Smoker    Smokeless tobacco: Never Used   Substance and Sexual Activity    Alcohol use: No    Drug use: No    Sexual activity: Yes     Partners: Male   Lifestyle    Physical activity     Days per week: Not on file     Minutes per session: Not on file    Stress: Not on file   Relationships    Social connections     Talks on phone: Not on file     Gets together: Not on file     Attends Sikh service: Not on file     Active member of club or organization: Not on file     Attends meetings of clubs or organizations: Not on file     Relationship status: Not on file    Intimate partner violence     Fear of current or ex partner: Not on file     Emotionally abused: Not on file     Physically abused: Not on file     Forced sexual activity: Not on file   Other Topics Concern    Not on file   Social History Narrative    Not on file         ALLERGIES: Codeine; Keflex [cephalexin]; Morphine; and Pcn [penicillins]    Review of Systems   Constitutional: Negative for chills and fever. Respiratory: Negative. Cardiovascular: Negative. Gastrointestinal: Positive for abdominal pain. Negative for constipation, hematochezia, melena, nausea and vomiting. Genitourinary: Negative. Neurological: Negative. Psychiatric/Behavioral: Negative for confusion and decreased concentration. All other systems reviewed and are negative. Vitals:    11/12/20 1725   BP: (!) 153/67   Pulse: 74   Resp: 16   Temp: 98.1 °F (36.7 °C)   SpO2: 99%   Weight: 122.5 kg (270 lb)   Height: 5' 1\" (1.549 m)            Physical Exam  Constitutional:       General: She is not in acute distress. Appearance: She is well-developed. She is not ill-appearing, toxic-appearing or diaphoretic. HENT:      Head: Atraumatic. Mouth/Throat:      Pharynx: Oropharynx is clear. Eyes:      General: No scleral icterus. Cardiovascular:      Rate and Rhythm: Normal rate and regular rhythm. Heart sounds: Normal heart sounds. Pulmonary:      Effort: Pulmonary effort is normal.      Breath sounds: Normal breath sounds. Abdominal:      General: Bowel sounds are normal.      Palpations: Abdomen is soft. Tenderness: There is abdominal tenderness in the right upper quadrant.  There is no right CVA tenderness, left CVA tenderness, guarding or rebound. Negative signs include Burnett's sign. Hernia: No hernia is present. Skin:     General: Skin is warm. Coloration: Skin is not pale. Findings: No erythema or rash. Neurological:      General: No focal deficit present. Mental Status: She is alert. Psychiatric:         Mood and Affect: Mood normal.         Behavior: Behavior normal.          MDM  Number of Diagnoses or Management Options  Upper abdominal pain:   Diagnosis management comments: No acute surgical issue identified. No defined present cause.  Will have follow with PMD and additionally will have them further evaluate changes from prior CT - may also need GYN eval. - this not area of presenting complaint       Amount and/or Complexity of Data Reviewed  Clinical lab tests: ordered and reviewed  Tests in the radiology section of CPT®: ordered and reviewed  Independent visualization of images, tracings, or specimens: yes    Risk of Complications, Morbidity, and/or Mortality  Presenting problems: moderate  Diagnostic procedures: low  Management options: moderate    Patient Progress  Patient progress: stable         Procedures

## 2020-11-13 NOTE — DISCHARGE INSTRUCTIONS
IMPRESSION  IMPRESSION:    1. No acute findings in the abdomen or pelvis. 2. Enlarging cystic structure in the right lower quadrant. Although relatively  simple in appearance, this has persisted for greater than a year and appears  related to the right adnexa. Given these features, a low-grade cystic neoplasm  cannot be excluded.     CONTACT YOUR MD FOR FURTHER WORK UP OF ADNEXAL CYST(CULD ALSO FOLLOW WITH YOUR GYN)    Pepcid or Prilosec for discomfort  Levsin for crampy pain  Ongoing work-up with your primary care physician but also could follow-up on an adnexal cyst with your GYN

## 2020-11-19 NOTE — ED TRIAGE NOTES
[facility] [address] [address] EMERGENCY DEPT  [address] [place] [id number]  Phone: [phone]               November 19, 2020    Patient: Trey Jalloh   YOB: 1977   Date of Visit: 11/19/2020       To Whom It May Concern:    Paras Martinez was seen and treated in our emergency department on 11/19/2020. She may return to work on 11/21/2020.       Sincerely,       [employer], PA         Signature:__________________________________ Pt fell off bar height stool at work  C/o mid back and lower back pain

## 2022-03-09 ENCOUNTER — ANESTHESIA EVENT (OUTPATIENT)
Dept: SURGERY | Age: 37
DRG: 951 | End: 2022-03-09
Payer: MEDICAID

## 2022-03-09 ENCOUNTER — APPOINTMENT (OUTPATIENT)
Dept: CT IMAGING | Age: 37
DRG: 951 | End: 2022-03-09
Attending: PHYSICIAN ASSISTANT
Payer: MEDICAID

## 2022-03-09 ENCOUNTER — APPOINTMENT (OUTPATIENT)
Dept: ULTRASOUND IMAGING | Age: 37
DRG: 951 | End: 2022-03-09
Attending: PHYSICIAN ASSISTANT
Payer: MEDICAID

## 2022-03-09 ENCOUNTER — HOSPITAL ENCOUNTER (INPATIENT)
Age: 37
LOS: 2 days | Discharge: HOME | DRG: 951 | End: 2022-03-12
Attending: EMERGENCY MEDICINE | Admitting: OBSTETRICS & GYNECOLOGY
Payer: MEDICAID

## 2022-03-09 DIAGNOSIS — R11.2 NAUSEA AND VOMITING, INTRACTABILITY OF VOMITING NOT SPECIFIED, UNSPECIFIED VOMITING TYPE: ICD-10-CM

## 2022-03-09 DIAGNOSIS — R10.31 RLQ ABDOMINAL PAIN: Primary | ICD-10-CM

## 2022-03-09 DIAGNOSIS — Z53.31 LAPAROSCOPIC SURGICAL PROCEDURE CONVERTED TO OPEN PROCEDURE: ICD-10-CM

## 2022-03-09 DIAGNOSIS — N83.519 OVARIAN TORSION: ICD-10-CM

## 2022-03-09 DIAGNOSIS — R19.00 PELVIC MASS IN FEMALE: ICD-10-CM

## 2022-03-09 DIAGNOSIS — R10.2 PELVIC PAIN IN FEMALE: ICD-10-CM

## 2022-03-09 LAB
ALBUMIN SERPL-MCNC: 3.2 G/DL (ref 3.5–5)
ALBUMIN/GLOB SERPL: 0.7 {RATIO} (ref 1.2–3.5)
ALP SERPL-CCNC: 99 U/L (ref 50–136)
ALT SERPL-CCNC: 21 U/L (ref 12–65)
ANION GAP SERPL CALC-SCNC: 5 MMOL/L (ref 7–16)
AST SERPL-CCNC: 13 U/L (ref 15–37)
BACTERIA URNS QL MICRO: ABNORMAL /HPF
BASOPHILS # BLD: 0 K/UL (ref 0–0.2)
BASOPHILS NFR BLD: 0 % (ref 0–2)
BILIRUB SERPL-MCNC: 0.2 MG/DL (ref 0.2–1.1)
BUN SERPL-MCNC: 10 MG/DL (ref 6–23)
CALCIUM SERPL-MCNC: 9.1 MG/DL (ref 8.3–10.4)
CASTS URNS QL MICRO: 0 /LPF
CHLORIDE SERPL-SCNC: 108 MMOL/L (ref 98–107)
CO2 SERPL-SCNC: 28 MMOL/L (ref 21–32)
COVID-19 RAPID TEST, COVR: NOT DETECTED
CREAT SERPL-MCNC: 0.87 MG/DL (ref 0.6–1)
CRYSTALS URNS QL MICRO: 0 /LPF
DIFFERENTIAL METHOD BLD: NORMAL
EOSINOPHIL # BLD: 0.1 K/UL (ref 0–0.8)
EOSINOPHIL NFR BLD: 1 % (ref 0.5–7.8)
EPI CELLS #/AREA URNS HPF: ABNORMAL /HPF
ERYTHROCYTE [DISTWIDTH] IN BLOOD BY AUTOMATED COUNT: 14.2 % (ref 11.9–14.6)
GLOBULIN SER CALC-MCNC: 4.6 G/DL (ref 2.3–3.5)
GLUCOSE SERPL-MCNC: 91 MG/DL (ref 65–100)
HCG UR QL: NEGATIVE
HCT VFR BLD AUTO: 38.6 % (ref 35.8–46.3)
HGB BLD-MCNC: 12.5 G/DL (ref 11.7–15.4)
IMM GRANULOCYTES # BLD AUTO: 0.1 K/UL (ref 0–0.5)
IMM GRANULOCYTES NFR BLD AUTO: 1 % (ref 0–5)
LACTATE SERPL-SCNC: 1.6 MMOL/L (ref 0.4–2)
LIPASE SERPL-CCNC: 140 U/L (ref 73–393)
LYMPHOCYTES # BLD: 3.1 K/UL (ref 0.5–4.6)
LYMPHOCYTES NFR BLD: 29 % (ref 13–44)
MCH RBC QN AUTO: 28.7 PG (ref 26.1–32.9)
MCHC RBC AUTO-ENTMCNC: 32.4 G/DL (ref 31.4–35)
MCV RBC AUTO: 88.5 FL (ref 79.6–97.8)
MONOCYTES # BLD: 0.6 K/UL (ref 0.1–1.3)
MONOCYTES NFR BLD: 5 % (ref 4–12)
MUCOUS THREADS URNS QL MICRO: ABNORMAL /LPF
NEUTS SEG # BLD: 6.9 K/UL (ref 1.7–8.2)
NEUTS SEG NFR BLD: 64 % (ref 43–78)
NRBC # BLD: 0 K/UL (ref 0–0.2)
OTHER OBSERVATIONS,UCOM: ABNORMAL
PLATELET # BLD AUTO: 439 K/UL (ref 150–450)
PMV BLD AUTO: 10.5 FL (ref 9.4–12.3)
POTASSIUM SERPL-SCNC: 3.1 MMOL/L (ref 3.5–5.1)
PROT SERPL-MCNC: 7.8 G/DL (ref 6.3–8.2)
RBC # BLD AUTO: 4.36 M/UL (ref 4.05–5.2)
RBC #/AREA URNS HPF: ABNORMAL /HPF
SODIUM SERPL-SCNC: 141 MMOL/L (ref 136–145)
SOURCE, COVRS: NORMAL
WBC # BLD AUTO: 10.8 K/UL (ref 4.3–11.1)
WBC URNS QL MICRO: ABNORMAL /HPF

## 2022-03-09 PROCEDURE — 74011000258 HC RX REV CODE- 258: Performed by: EMERGENCY MEDICINE

## 2022-03-09 PROCEDURE — 87635 SARS-COV-2 COVID-19 AMP PRB: CPT

## 2022-03-09 PROCEDURE — 74011000250 HC RX REV CODE- 250: Performed by: EMERGENCY MEDICINE

## 2022-03-09 PROCEDURE — 96376 TX/PRO/DX INJ SAME DRUG ADON: CPT

## 2022-03-09 PROCEDURE — 81025 URINE PREGNANCY TEST: CPT

## 2022-03-09 PROCEDURE — 74011250636 HC RX REV CODE- 250/636: Performed by: PHYSICIAN ASSISTANT

## 2022-03-09 PROCEDURE — 83690 ASSAY OF LIPASE: CPT

## 2022-03-09 PROCEDURE — 99285 EMERGENCY DEPT VISIT HI MDM: CPT

## 2022-03-09 PROCEDURE — 83605 ASSAY OF LACTIC ACID: CPT

## 2022-03-09 PROCEDURE — 74011000636 HC RX REV CODE- 636: Performed by: EMERGENCY MEDICINE

## 2022-03-09 PROCEDURE — 81015 MICROSCOPIC EXAM OF URINE: CPT

## 2022-03-09 PROCEDURE — 76856 US EXAM PELVIC COMPLETE: CPT

## 2022-03-09 PROCEDURE — 80053 COMPREHEN METABOLIC PANEL: CPT

## 2022-03-09 PROCEDURE — 96375 TX/PRO/DX INJ NEW DRUG ADDON: CPT

## 2022-03-09 PROCEDURE — 96374 THER/PROPH/DIAG INJ IV PUSH: CPT

## 2022-03-09 PROCEDURE — 86900 BLOOD TYPING SEROLOGIC ABO: CPT

## 2022-03-09 PROCEDURE — 74177 CT ABD & PELVIS W/CONTRAST: CPT

## 2022-03-09 PROCEDURE — 87086 URINE CULTURE/COLONY COUNT: CPT

## 2022-03-09 PROCEDURE — 81003 URINALYSIS AUTO W/O SCOPE: CPT

## 2022-03-09 PROCEDURE — 93005 ELECTROCARDIOGRAM TRACING: CPT | Performed by: EMERGENCY MEDICINE

## 2022-03-09 PROCEDURE — 85025 COMPLETE CBC W/AUTO DIFF WBC: CPT

## 2022-03-09 RX ORDER — ONDANSETRON 2 MG/ML
4 INJECTION INTRAMUSCULAR; INTRAVENOUS ONCE
Status: COMPLETED | OUTPATIENT
Start: 2022-03-09 | End: 2022-03-09

## 2022-03-09 RX ORDER — SODIUM CHLORIDE 0.9 % (FLUSH) 0.9 %
5-10 SYRINGE (ML) INJECTION AS NEEDED
Status: DISCONTINUED | OUTPATIENT
Start: 2022-03-09 | End: 2022-03-12 | Stop reason: HOSPADM

## 2022-03-09 RX ORDER — HYDROMORPHONE HYDROCHLORIDE 1 MG/ML
1 INJECTION, SOLUTION INTRAMUSCULAR; INTRAVENOUS; SUBCUTANEOUS
Status: COMPLETED | OUTPATIENT
Start: 2022-03-09 | End: 2022-03-09

## 2022-03-09 RX ORDER — HYDROMORPHONE HYDROCHLORIDE 1 MG/ML
0.5 INJECTION, SOLUTION INTRAMUSCULAR; INTRAVENOUS; SUBCUTANEOUS ONCE
Status: COMPLETED | OUTPATIENT
Start: 2022-03-09 | End: 2022-03-09

## 2022-03-09 RX ORDER — SODIUM CHLORIDE 0.9 % (FLUSH) 0.9 %
5-10 SYRINGE (ML) INJECTION EVERY 8 HOURS
Status: DISCONTINUED | OUTPATIENT
Start: 2022-03-09 | End: 2022-03-12 | Stop reason: HOSPADM

## 2022-03-09 RX ORDER — SODIUM CHLORIDE 0.9 % (FLUSH) 0.9 %
10 SYRINGE (ML) INJECTION
Status: COMPLETED | OUTPATIENT
Start: 2022-03-09 | End: 2022-03-09

## 2022-03-09 RX ORDER — ONDANSETRON 2 MG/ML
4 INJECTION INTRAMUSCULAR; INTRAVENOUS
Status: COMPLETED | OUTPATIENT
Start: 2022-03-09 | End: 2022-03-09

## 2022-03-09 RX ADMIN — SODIUM CHLORIDE 100 ML: 9 INJECTION, SOLUTION INTRAVENOUS at 19:40

## 2022-03-09 RX ADMIN — HYDROMORPHONE HYDROCHLORIDE 0.5 MG: 1 INJECTION, SOLUTION INTRAMUSCULAR; INTRAVENOUS; SUBCUTANEOUS at 23:13

## 2022-03-09 RX ADMIN — HYDROMORPHONE HYDROCHLORIDE 0.5 MG: 1 INJECTION, SOLUTION INTRAMUSCULAR; INTRAVENOUS; SUBCUTANEOUS at 22:22

## 2022-03-09 RX ADMIN — SODIUM CHLORIDE, PRESERVATIVE FREE 5 ML: 5 INJECTION INTRAVENOUS at 21:41

## 2022-03-09 RX ADMIN — HYDROMORPHONE HYDROCHLORIDE 1 MG: 1 INJECTION, SOLUTION INTRAMUSCULAR; INTRAVENOUS; SUBCUTANEOUS at 18:54

## 2022-03-09 RX ADMIN — ONDANSETRON 4 MG: 2 INJECTION INTRAMUSCULAR; INTRAVENOUS at 23:13

## 2022-03-09 RX ADMIN — Medication 10 ML: at 19:40

## 2022-03-09 RX ADMIN — IOPAMIDOL 100 ML: 755 INJECTION, SOLUTION INTRAVENOUS at 19:41

## 2022-03-09 RX ADMIN — SODIUM CHLORIDE 1000 ML: 900 INJECTION, SOLUTION INTRAVENOUS at 23:16

## 2022-03-09 RX ADMIN — ONDANSETRON 4 MG: 2 INJECTION INTRAMUSCULAR; INTRAVENOUS at 18:54

## 2022-03-09 RX ADMIN — HYDROMORPHONE HYDROCHLORIDE 1 MG: 1 INJECTION, SOLUTION INTRAMUSCULAR; INTRAVENOUS; SUBCUTANEOUS at 21:40

## 2022-03-09 NOTE — ED TRIAGE NOTES
Pt reports right lower quadrant abdominal pain that started about a month ago. Pt states the pain has progressively gotten worse. Pt also reports dark stools and loss of appetite.     Jam Billingsley RN

## 2022-03-10 ENCOUNTER — ANESTHESIA (OUTPATIENT)
Dept: SURGERY | Age: 37
DRG: 951 | End: 2022-03-10
Payer: MEDICAID

## 2022-03-10 PROBLEM — R10.2 PELVIC PAIN IN FEMALE: Status: ACTIVE | Noted: 2022-03-10

## 2022-03-10 PROBLEM — Z53.31 LAPAROSCOPIC SURGICAL PROCEDURE CONVERTED TO OPEN PROCEDURE: Status: ACTIVE | Noted: 2022-03-10

## 2022-03-10 PROBLEM — R19.00 PELVIC MASS IN FEMALE: Status: ACTIVE | Noted: 2022-03-10

## 2022-03-10 LAB
ABO + RH BLD: NORMAL
ATRIAL RATE: 69 BPM
BLOOD GROUP ANTIBODIES SERPL: NORMAL
CALCULATED P AXIS, ECG09: 59 DEGREES
CALCULATED R AXIS, ECG10: 26 DEGREES
CALCULATED T AXIS, ECG11: 54 DEGREES
DIAGNOSIS, 93000: NORMAL
ERYTHROCYTE [DISTWIDTH] IN BLOOD BY AUTOMATED COUNT: 14.6 % (ref 11.9–14.6)
HCT VFR BLD AUTO: 38.4 % (ref 35.8–46.3)
HGB BLD-MCNC: 12.2 G/DL (ref 11.7–15.4)
MCH RBC QN AUTO: 28.6 PG (ref 26.1–32.9)
MCHC RBC AUTO-ENTMCNC: 31.8 G/DL (ref 31.4–35)
MCV RBC AUTO: 89.9 FL (ref 79.6–97.8)
NRBC # BLD: 0 K/UL (ref 0–0.2)
P-R INTERVAL, ECG05: 156 MS
PLATELET # BLD AUTO: 411 K/UL (ref 150–450)
PMV BLD AUTO: 10.2 FL (ref 9.4–12.3)
Q-T INTERVAL, ECG07: 412 MS
QRS DURATION, ECG06: 94 MS
QTC CALCULATION (BEZET), ECG08: 441 MS
RBC # BLD AUTO: 4.27 M/UL (ref 4.05–5.2)
SPECIMEN EXP DATE BLD: NORMAL
VENTRICULAR RATE, ECG03: 69 BPM
WBC # BLD AUTO: 28.8 K/UL (ref 4.3–11.1)

## 2022-03-10 PROCEDURE — 0UJ30ZZ INSPECTION OF OVARY, OPEN APPROACH: ICD-10-PCS | Performed by: OBSTETRICS & GYNECOLOGY

## 2022-03-10 PROCEDURE — 77030034154 HC SHR COAG HARM ACE J&J -F: Performed by: OBSTETRICS & GYNECOLOGY

## 2022-03-10 PROCEDURE — 77030018836 HC SOL IRR NACL ICUM -A: Performed by: OBSTETRICS & GYNECOLOGY

## 2022-03-10 PROCEDURE — 77030008518 HC TBNG INSUF ENDO STRY -B: Performed by: OBSTETRICS & GYNECOLOGY

## 2022-03-10 PROCEDURE — 74011250636 HC RX REV CODE- 250/636: Performed by: ANESTHESIOLOGY

## 2022-03-10 PROCEDURE — 77030031139 HC SUT VCRL2 J&J -A: Performed by: OBSTETRICS & GYNECOLOGY

## 2022-03-10 PROCEDURE — 74011250636 HC RX REV CODE- 250/636: Performed by: NURSE ANESTHETIST, CERTIFIED REGISTERED

## 2022-03-10 PROCEDURE — 74011250636 HC RX REV CODE- 250/636: Performed by: OBSTETRICS & GYNECOLOGY

## 2022-03-10 PROCEDURE — 76210000006 HC OR PH I REC 0.5 TO 1 HR: Performed by: OBSTETRICS & GYNECOLOGY

## 2022-03-10 PROCEDURE — 2709999900 HC NON-CHARGEABLE SUPPLY

## 2022-03-10 PROCEDURE — 77030012770 HC TRCR OPT FX AMR -B: Performed by: OBSTETRICS & GYNECOLOGY

## 2022-03-10 PROCEDURE — 36415 COLL VENOUS BLD VENIPUNCTURE: CPT

## 2022-03-10 PROCEDURE — 77030008608 HC TRCR ENDOSC SMTH AMR -B: Performed by: OBSTETRICS & GYNECOLOGY

## 2022-03-10 PROCEDURE — 76060000035 HC ANESTHESIA 2 TO 2.5 HR: Performed by: OBSTETRICS & GYNECOLOGY

## 2022-03-10 PROCEDURE — 77030040361 HC SLV COMPR DVT MDII -B: Performed by: OBSTETRICS & GYNECOLOGY

## 2022-03-10 PROCEDURE — 77030010507 HC ADH SKN DERMBND J&J -B: Performed by: OBSTETRICS & GYNECOLOGY

## 2022-03-10 PROCEDURE — 0UJD0ZZ INSPECTION OF UTERUS AND CERVIX, OPEN APPROACH: ICD-10-PCS | Performed by: OBSTETRICS & GYNECOLOGY

## 2022-03-10 PROCEDURE — 77030011502 HC MANIP UTER ZUM ZINN -B: Performed by: OBSTETRICS & GYNECOLOGY

## 2022-03-10 PROCEDURE — 77030008459 HC STPLR SKN COOP -B: Performed by: OBSTETRICS & GYNECOLOGY

## 2022-03-10 PROCEDURE — 77030021678 HC GLIDESCP STAT DISP VERT -B: Performed by: ANESTHESIOLOGY

## 2022-03-10 PROCEDURE — 77030040922 HC BLNKT HYPOTHRM STRY -A: Performed by: ANESTHESIOLOGY

## 2022-03-10 PROCEDURE — 74011000250 HC RX REV CODE- 250: Performed by: NURSE ANESTHETIST, CERTIFIED REGISTERED

## 2022-03-10 PROCEDURE — 74011000258 HC RX REV CODE- 258: Performed by: OBSTETRICS & GYNECOLOGY

## 2022-03-10 PROCEDURE — 2709999900 HC NON-CHARGEABLE SUPPLY: Performed by: OBSTETRICS & GYNECOLOGY

## 2022-03-10 PROCEDURE — 77030020829: Performed by: OBSTETRICS & GYNECOLOGY

## 2022-03-10 PROCEDURE — 74011000250 HC RX REV CODE- 250: Performed by: EMERGENCY MEDICINE

## 2022-03-10 PROCEDURE — 76010000131 HC OR TIME 2 TO 2.5 HR: Performed by: OBSTETRICS & GYNECOLOGY

## 2022-03-10 PROCEDURE — 77030002933 HC SUT MCRYL J&J -A: Performed by: OBSTETRICS & GYNECOLOGY

## 2022-03-10 PROCEDURE — 65270000029 HC RM PRIVATE

## 2022-03-10 PROCEDURE — 77030008606 HC TRCR ENDOSC KII AMR -B: Performed by: OBSTETRICS & GYNECOLOGY

## 2022-03-10 PROCEDURE — 85027 COMPLETE CBC AUTOMATED: CPT

## 2022-03-10 PROCEDURE — 74011000250 HC RX REV CODE- 250: Performed by: OBSTETRICS & GYNECOLOGY

## 2022-03-10 PROCEDURE — 93005 ELECTROCARDIOGRAM TRACING: CPT | Performed by: EMERGENCY MEDICINE

## 2022-03-10 PROCEDURE — 77030008522 HC TBNG INSUF LAPRO STRY -B: Performed by: OBSTETRICS & GYNECOLOGY

## 2022-03-10 PROCEDURE — 74011000250 HC RX REV CODE- 250: Performed by: ANESTHESIOLOGY

## 2022-03-10 PROCEDURE — 77030007955 HC PCH ENDOSC SPEC J&J -B: Performed by: OBSTETRICS & GYNECOLOGY

## 2022-03-10 PROCEDURE — 77030037088 HC TUBE ENDOTRACH ORAL NSL COVD-A: Performed by: ANESTHESIOLOGY

## 2022-03-10 PROCEDURE — 77030038160 HC SHR COAG HARM J&J -F: Performed by: OBSTETRICS & GYNECOLOGY

## 2022-03-10 PROCEDURE — 77030002974 HC SUT PLN J&J -A: Performed by: OBSTETRICS & GYNECOLOGY

## 2022-03-10 PROCEDURE — 94760 N-INVAS EAR/PLS OXIMETRY 1: CPT

## 2022-03-10 PROCEDURE — 0UJ80ZZ INSPECTION OF FALLOPIAN TUBE, OPEN APPROACH: ICD-10-PCS | Performed by: OBSTETRICS & GYNECOLOGY

## 2022-03-10 PROCEDURE — 94762 N-INVAS EAR/PLS OXIMTRY CONT: CPT

## 2022-03-10 RX ORDER — DIPHENHYDRAMINE HYDROCHLORIDE 50 MG/ML
12.5 INJECTION, SOLUTION INTRAMUSCULAR; INTRAVENOUS
Status: DISCONTINUED | OUTPATIENT
Start: 2022-03-10 | End: 2022-03-10 | Stop reason: HOSPADM

## 2022-03-10 RX ORDER — ENOXAPARIN SODIUM 100 MG/ML
40 INJECTION SUBCUTANEOUS EVERY 12 HOURS
Status: DISCONTINUED | OUTPATIENT
Start: 2022-03-10 | End: 2022-03-12 | Stop reason: HOSPADM

## 2022-03-10 RX ORDER — CLINDAMYCIN PHOSPHATE 900 MG/50ML
900 INJECTION, SOLUTION INTRAVENOUS ONCE
Status: DISCONTINUED | OUTPATIENT
Start: 2022-03-10 | End: 2022-03-10

## 2022-03-10 RX ORDER — ROCURONIUM BROMIDE 10 MG/ML
INJECTION, SOLUTION INTRAVENOUS AS NEEDED
Status: DISCONTINUED | OUTPATIENT
Start: 2022-03-10 | End: 2022-03-10 | Stop reason: HOSPADM

## 2022-03-10 RX ORDER — BUPIVACAINE HYDROCHLORIDE 5 MG/ML
INJECTION, SOLUTION EPIDURAL; INTRACAUDAL AS NEEDED
Status: DISCONTINUED | OUTPATIENT
Start: 2022-03-10 | End: 2022-03-10 | Stop reason: HOSPADM

## 2022-03-10 RX ORDER — LIDOCAINE HYDROCHLORIDE 20 MG/ML
INJECTION, SOLUTION EPIDURAL; INFILTRATION; INTRACAUDAL; PERINEURAL AS NEEDED
Status: DISCONTINUED | OUTPATIENT
Start: 2022-03-10 | End: 2022-03-10 | Stop reason: HOSPADM

## 2022-03-10 RX ORDER — SODIUM CHLORIDE, SODIUM LACTATE, POTASSIUM CHLORIDE, CALCIUM CHLORIDE 600; 310; 30; 20 MG/100ML; MG/100ML; MG/100ML; MG/100ML
100 INJECTION, SOLUTION INTRAVENOUS CONTINUOUS
Status: DISCONTINUED | OUTPATIENT
Start: 2022-03-10 | End: 2022-03-12 | Stop reason: HOSPADM

## 2022-03-10 RX ORDER — PROPOFOL 10 MG/ML
INJECTION, EMULSION INTRAVENOUS AS NEEDED
Status: DISCONTINUED | OUTPATIENT
Start: 2022-03-10 | End: 2022-03-10 | Stop reason: HOSPADM

## 2022-03-10 RX ORDER — SODIUM CHLORIDE, SODIUM LACTATE, POTASSIUM CHLORIDE, CALCIUM CHLORIDE 600; 310; 30; 20 MG/100ML; MG/100ML; MG/100ML; MG/100ML
75 INJECTION, SOLUTION INTRAVENOUS CONTINUOUS
Status: DISCONTINUED | OUTPATIENT
Start: 2022-03-10 | End: 2022-03-10 | Stop reason: HOSPADM

## 2022-03-10 RX ORDER — KETOROLAC TROMETHAMINE 30 MG/ML
30 INJECTION, SOLUTION INTRAMUSCULAR; INTRAVENOUS EVERY 6 HOURS
Status: DISCONTINUED | OUTPATIENT
Start: 2022-03-10 | End: 2022-03-10

## 2022-03-10 RX ORDER — HALOPERIDOL 5 MG/ML
1 INJECTION INTRAMUSCULAR
Status: DISCONTINUED | OUTPATIENT
Start: 2022-03-10 | End: 2022-03-10 | Stop reason: HOSPADM

## 2022-03-10 RX ORDER — DEXAMETHASONE SODIUM PHOSPHATE 4 MG/ML
INJECTION, SOLUTION INTRA-ARTICULAR; INTRALESIONAL; INTRAMUSCULAR; INTRAVENOUS; SOFT TISSUE AS NEEDED
Status: DISCONTINUED | OUTPATIENT
Start: 2022-03-10 | End: 2022-03-10 | Stop reason: HOSPADM

## 2022-03-10 RX ORDER — KETOROLAC TROMETHAMINE 30 MG/ML
INJECTION, SOLUTION INTRAMUSCULAR; INTRAVENOUS AS NEEDED
Status: DISCONTINUED | OUTPATIENT
Start: 2022-03-10 | End: 2022-03-10 | Stop reason: HOSPADM

## 2022-03-10 RX ORDER — ONDANSETRON 2 MG/ML
INJECTION INTRAMUSCULAR; INTRAVENOUS AS NEEDED
Status: DISCONTINUED | OUTPATIENT
Start: 2022-03-10 | End: 2022-03-10 | Stop reason: HOSPADM

## 2022-03-10 RX ORDER — KETAMINE HYDROCHLORIDE 50 MG/ML
INJECTION, SOLUTION INTRAMUSCULAR; INTRAVENOUS AS NEEDED
Status: DISCONTINUED | OUTPATIENT
Start: 2022-03-10 | End: 2022-03-10 | Stop reason: HOSPADM

## 2022-03-10 RX ORDER — SODIUM CHLORIDE 0.9 % (FLUSH) 0.9 %
5-40 SYRINGE (ML) INJECTION AS NEEDED
Status: DISCONTINUED | OUTPATIENT
Start: 2022-03-10 | End: 2022-03-12 | Stop reason: HOSPADM

## 2022-03-10 RX ORDER — ONDANSETRON 2 MG/ML
4 INJECTION INTRAMUSCULAR; INTRAVENOUS
Status: DISCONTINUED | OUTPATIENT
Start: 2022-03-10 | End: 2022-03-12 | Stop reason: HOSPADM

## 2022-03-10 RX ORDER — FLUMAZENIL 0.1 MG/ML
0.2 INJECTION INTRAVENOUS
Status: DISCONTINUED | OUTPATIENT
Start: 2022-03-10 | End: 2022-03-10 | Stop reason: HOSPADM

## 2022-03-10 RX ORDER — SODIUM CHLORIDE, SODIUM LACTATE, POTASSIUM CHLORIDE, CALCIUM CHLORIDE 600; 310; 30; 20 MG/100ML; MG/100ML; MG/100ML; MG/100ML
INJECTION, SOLUTION INTRAVENOUS
Status: DISCONTINUED | OUTPATIENT
Start: 2022-03-10 | End: 2022-03-10 | Stop reason: HOSPADM

## 2022-03-10 RX ORDER — NALOXONE HYDROCHLORIDE 0.4 MG/ML
0.1 INJECTION, SOLUTION INTRAMUSCULAR; INTRAVENOUS; SUBCUTANEOUS
Status: DISCONTINUED | OUTPATIENT
Start: 2022-03-10 | End: 2022-03-10 | Stop reason: HOSPADM

## 2022-03-10 RX ORDER — SUCCINYLCHOLINE CHLORIDE 20 MG/ML
INJECTION INTRAMUSCULAR; INTRAVENOUS AS NEEDED
Status: DISCONTINUED | OUTPATIENT
Start: 2022-03-10 | End: 2022-03-10 | Stop reason: HOSPADM

## 2022-03-10 RX ORDER — FLUOXETINE HYDROCHLORIDE 20 MG/1
60 CAPSULE ORAL DAILY
COMMUNITY

## 2022-03-10 RX ORDER — OXYCODONE HYDROCHLORIDE 5 MG/1
5 TABLET ORAL
Status: DISCONTINUED | OUTPATIENT
Start: 2022-03-10 | End: 2022-03-10 | Stop reason: HOSPADM

## 2022-03-10 RX ORDER — CLINDAMYCIN PHOSPHATE 900 MG/50ML
900 INJECTION INTRAVENOUS ONCE
Status: COMPLETED | OUTPATIENT
Start: 2022-03-10 | End: 2022-03-10

## 2022-03-10 RX ORDER — HYDROMORPHONE HYDROCHLORIDE 1 MG/ML
1 INJECTION, SOLUTION INTRAMUSCULAR; INTRAVENOUS; SUBCUTANEOUS
Status: DISCONTINUED | OUTPATIENT
Start: 2022-03-10 | End: 2022-03-11

## 2022-03-10 RX ORDER — HYDROMORPHONE HYDROCHLORIDE 2 MG/ML
0.5 INJECTION, SOLUTION INTRAMUSCULAR; INTRAVENOUS; SUBCUTANEOUS
Status: DISCONTINUED | OUTPATIENT
Start: 2022-03-10 | End: 2022-03-10 | Stop reason: HOSPADM

## 2022-03-10 RX ORDER — POTASSIUM CHLORIDE 14.9 MG/ML
20 INJECTION INTRAVENOUS
Status: COMPLETED | OUTPATIENT
Start: 2022-03-10 | End: 2022-03-10

## 2022-03-10 RX ORDER — KETOROLAC TROMETHAMINE 30 MG/ML
30 INJECTION, SOLUTION INTRAMUSCULAR; INTRAVENOUS EVERY 6 HOURS
Status: COMPLETED | OUTPATIENT
Start: 2022-03-10 | End: 2022-03-11

## 2022-03-10 RX ORDER — SODIUM CHLORIDE 0.9 % (FLUSH) 0.9 %
5-40 SYRINGE (ML) INJECTION EVERY 8 HOURS
Status: DISCONTINUED | OUTPATIENT
Start: 2022-03-10 | End: 2022-03-12 | Stop reason: HOSPADM

## 2022-03-10 RX ADMIN — ROCURONIUM BROMIDE 25 MG: 50 INJECTION, SOLUTION INTRAVENOUS at 00:58

## 2022-03-10 RX ADMIN — SUGAMMADEX 200 MG: 100 INJECTION, SOLUTION INTRAVENOUS at 02:54

## 2022-03-10 RX ADMIN — POTASSIUM CHLORIDE 20 MEQ: 14.9 INJECTION, SOLUTION INTRAVENOUS at 16:56

## 2022-03-10 RX ADMIN — LIDOCAINE HYDROCHLORIDE 100 MG: 20 INJECTION, SOLUTION EPIDURAL; INFILTRATION; INTRACAUDAL; PERINEURAL at 00:48

## 2022-03-10 RX ADMIN — HYDROMORPHONE HYDROCHLORIDE 1 MG: 1 INJECTION, SOLUTION INTRAMUSCULAR; INTRAVENOUS; SUBCUTANEOUS at 11:30

## 2022-03-10 RX ADMIN — ENOXAPARIN SODIUM 40 MG: 100 INJECTION SUBCUTANEOUS at 17:00

## 2022-03-10 RX ADMIN — SODIUM CHLORIDE, PRESERVATIVE FREE 10 ML: 5 INJECTION INTRAVENOUS at 04:35

## 2022-03-10 RX ADMIN — SUCCINYLCHOLINE CHLORIDE 160 MG: 20 INJECTION, SOLUTION INTRAMUSCULAR; INTRAVENOUS at 00:48

## 2022-03-10 RX ADMIN — KETOROLAC TROMETHAMINE 30 MG: 30 INJECTION, SOLUTION INTRAMUSCULAR at 21:19

## 2022-03-10 RX ADMIN — HYDROMORPHONE HYDROCHLORIDE 0.5 MG: 2 INJECTION, SOLUTION INTRAMUSCULAR; INTRAVENOUS; SUBCUTANEOUS at 03:30

## 2022-03-10 RX ADMIN — KETAMINE HYDROCHLORIDE 30 MG: 50 INJECTION, SOLUTION INTRAMUSCULAR; INTRAVENOUS at 01:49

## 2022-03-10 RX ADMIN — DEXAMETHASONE SODIUM PHOSPHATE 4 MG: 4 INJECTION, SOLUTION INTRAMUSCULAR; INTRAVENOUS at 01:04

## 2022-03-10 RX ADMIN — SODIUM CHLORIDE, SODIUM LACTATE, POTASSIUM CHLORIDE, AND CALCIUM CHLORIDE 100 ML/HR: 600; 310; 30; 20 INJECTION, SOLUTION INTRAVENOUS at 16:57

## 2022-03-10 RX ADMIN — KETOROLAC TROMETHAMINE 30 MG: 30 INJECTION, SOLUTION INTRAMUSCULAR at 14:51

## 2022-03-10 RX ADMIN — SODIUM CHLORIDE, SODIUM LACTATE, POTASSIUM CHLORIDE, AND CALCIUM CHLORIDE: 600; 310; 30; 20 INJECTION, SOLUTION INTRAVENOUS at 00:42

## 2022-03-10 RX ADMIN — ROCURONIUM BROMIDE 10 MG: 50 INJECTION, SOLUTION INTRAVENOUS at 01:38

## 2022-03-10 RX ADMIN — SUGAMMADEX 200 MG: 100 INJECTION, SOLUTION INTRAVENOUS at 02:48

## 2022-03-10 RX ADMIN — GENTAMICIN SULFATE 450 MG: 40 INJECTION, SOLUTION INTRAMUSCULAR; INTRAVENOUS at 03:15

## 2022-03-10 RX ADMIN — ONDANSETRON 4 MG: 2 INJECTION INTRAMUSCULAR; INTRAVENOUS at 01:04

## 2022-03-10 RX ADMIN — KETOROLAC TROMETHAMINE 30 MG: 30 INJECTION, SOLUTION INTRAMUSCULAR; INTRAVENOUS at 02:48

## 2022-03-10 RX ADMIN — ROCURONIUM BROMIDE 10 MG: 50 INJECTION, SOLUTION INTRAVENOUS at 01:59

## 2022-03-10 RX ADMIN — SODIUM CHLORIDE, PRESERVATIVE FREE 10 ML: 5 INJECTION INTRAVENOUS at 21:19

## 2022-03-10 RX ADMIN — PROPOFOL 200 MG: 10 INJECTION, EMULSION INTRAVENOUS at 00:48

## 2022-03-10 RX ADMIN — SODIUM CHLORIDE, SODIUM LACTATE, POTASSIUM CHLORIDE, CALCIUM CHLORIDE: 600; 310; 30; 20 INJECTION, SOLUTION INTRAVENOUS at 01:59

## 2022-03-10 RX ADMIN — SODIUM CHLORIDE, PRESERVATIVE FREE 10 ML: 5 INJECTION INTRAVENOUS at 14:51

## 2022-03-10 RX ADMIN — FAMOTIDINE 20 MG: 10 INJECTION, SOLUTION INTRAVENOUS at 00:29

## 2022-03-10 RX ADMIN — ROCURONIUM BROMIDE 5 MG: 50 INJECTION, SOLUTION INTRAVENOUS at 00:48

## 2022-03-10 RX ADMIN — HYDROMORPHONE HYDROCHLORIDE 0.5 MG: 2 INJECTION, SOLUTION INTRAMUSCULAR; INTRAVENOUS; SUBCUTANEOUS at 03:12

## 2022-03-10 RX ADMIN — POTASSIUM CHLORIDE 20 MEQ: 14.9 INJECTION, SOLUTION INTRAVENOUS at 21:18

## 2022-03-10 RX ADMIN — KETOROLAC TROMETHAMINE 30 MG: 30 INJECTION, SOLUTION INTRAMUSCULAR at 09:11

## 2022-03-10 RX ADMIN — KETAMINE HYDROCHLORIDE 10 MG: 50 INJECTION, SOLUTION INTRAMUSCULAR; INTRAVENOUS at 02:54

## 2022-03-10 RX ADMIN — HYDROMORPHONE HYDROCHLORIDE 1 MG: 1 INJECTION, SOLUTION INTRAMUSCULAR; INTRAVENOUS; SUBCUTANEOUS at 16:55

## 2022-03-10 RX ADMIN — ONDANSETRON 4 MG: 2 INJECTION INTRAMUSCULAR; INTRAVENOUS at 16:55

## 2022-03-10 RX ADMIN — CLINDAMYCIN PHOSPHATE 900 MG: 900 INJECTION, SOLUTION INTRAVENOUS at 04:30

## 2022-03-10 NOTE — ED PROVIDER NOTES
78-year-old well-appearing female presents today for evaluation of abdominal pain. Onset is described as this morning. She states that it began periumbilical and has migrated to the right lower quadrant. She reports associated nausea with one episode of vomiting. She states that the pain has progressively worsened throughout the day. She states that she had similar pain about 1 month ago however the pain seemed to subside on its own but due to the progressive nature of the pain today she decided to be evaluated. She reports history of cholecystectomy but does still have her appendix and denies any other abdominal surgeries. She denies any constipation or diarrhea but does state that her stool is somewhat darker than normal.  She denies any fever, chills, body aches. No chest pain or shortness of breath. Denies any vaginal discharge or bleeding. Dysuria, hematuria. [facility] 2/28, not concerned for pregnancy. No recent hospitalizations or surgeries. Past Medical History:   Diagnosis Date    Anxiety     Depression     GERD (gastroesophageal reflux disease)        Past Surgical History:   Procedure Laterality Date    HX CHOLECYSTECTOMY      HX GYN      c section    HX HEENT           History reviewed. No pertinent family history.     Social History     Socioeconomic History    Marital status: SINGLE     Spouse name: Not on file    Number of children: Not on file    Years of education: Not on file    Highest education level: Not on file   Occupational History    Not on file   Tobacco Use    Smoking status: Never Smoker    Smokeless tobacco: Never Used   Substance and Sexual Activity    Alcohol use: No    Drug use: No    Sexual activity: Yes     Partners: Male   Other Topics Concern    Not on file   Social History Narrative    Not on file     Social Determinants of Health     Financial Resource Strain:     Difficulty of Paying Living Expenses: Not on file   Food Insecurity:     Worried About Running Out of Food in the Last Year: Not on file    Ran Out of Food in the Last Year: Not on file   Transportation Needs:     Lack of Transportation (Medical): Not on file    Lack of Transportation (Non-Medical): Not on file   Physical Activity:     Days of Exercise per Week: Not on file    Minutes of Exercise per Session: Not on file   Stress:     Feeling of Stress : Not on file   Social Connections:     Frequency of Communication with Friends and Family: Not on file    Frequency of Social Gatherings with Friends and Family: Not on file    Attends Anabaptist Services: Not on file    Active Member of [address] [place] [address] or Organizations: Not on file    Attends Club or Organization Meetings: Not on file    Marital Status: Not on file   Intimate Partner Violence:     Fear of Current or Ex-Partner: Not on file    Emotionally Abused: Not on file    Physically Abused: Not on file    Sexually Abused: Not on file   Housing Stability:     Unable to Pay for Housing in the Last Year: Not on file    Number of [place] in the Last Year: Not on file    Unstable Housing in the Last Year: Not on file         ALLERGIES: Codeine, Keflex [cephalexin], Morphine, and Pcn [penicillins]    Review of Systems   Constitutional: Negative for activity change, chills and fever. HENT: Negative for congestion and sore throat. Respiratory: Negative for cough and shortness of breath. Cardiovascular: Negative for chest pain. Gastrointestinal: Positive for abdominal pain, nausea and vomiting. Genitourinary: Negative for dysuria, hematuria and vaginal discharge. Musculoskeletal: Negative for back pain and neck pain. Skin: Negative for rash. Neurological: Negative for dizziness, syncope and headaches. Vitals:    03/09/22 1802   BP: (!) 153/97   Pulse: 82   Resp: 16   Temp: 98 °F (36.7 °C)   SpO2: 98%   Weight: 149.7 kg (330 lb)   Height: 5' 2\" (1.575 m)            Physical Exam  Vitals and nursing note reviewed. Constitutional:       General: She is awake. She is not in acute distress. Appearance: She is well-developed and well-groomed. She is not toxic-appearing. HENT:      Head: Normocephalic and atraumatic. Right Ear: Tympanic membrane and ear canal normal. No hemotympanum. Tympanic membrane is not erythematous or bulging. Left Ear: Tympanic membrane and ear canal normal. No hemotympanum. Tympanic membrane is not erythematous or bulging. Mouth/Throat:      Mouth: Mucous membranes are moist.      Pharynx: Oropharynx is clear. Uvula midline. No pharyngeal swelling, oropharyngeal exudate or posterior oropharyngeal erythema. Tonsils: No tonsillar abscesses. Cardiovascular:      Rate and Rhythm: Normal rate and regular rhythm. Heart sounds: S1 normal and S2 normal. No murmur heard. No friction rub. No gallop. Pulmonary:      Effort: Pulmonary effort is normal.      Breath sounds: No wheezing, rhonchi or rales. Abdominal:      General: Bowel sounds are normal.      Palpations: Abdomen is soft. Tenderness: There is abdominal tenderness in the right upper quadrant, right lower quadrant and periumbilical area. There is guarding. There is no rebound. Musculoskeletal:      Cervical back: Normal range of motion. No rigidity. Lymphadenopathy:      Cervical: No cervical adenopathy. Skin:     General: Skin is warm and dry. Capillary Refill: Capillary refill takes less than 2 seconds. Neurological:      General: No focal deficit present. Mental Status: She is alert and oriented to person, place, and time. Sensory: Sensation is intact. Motor: Motor function is intact. Coordination: Coordination is intact.    Psychiatric:         Mood and Affect: Mood normal.         Speech: Speech normal.         Behavior: Behavior normal.          MDM  Number of Diagnoses or Management Options  Diagnosis management comments: DDX: Acute appendicitis, colitis, viral syndrome, ovarian cyst, ovarian torsion, bowel perforation, urinary tract infection    In summary this is a well-appearing 81-year-old female presenting today for evaluation of abdominal pain that began periumbilically and migrated to the right lower quadrant. Patient nontoxic in appearance and vital signs are stable. Physical exam reveals reproducible tenderness of the right lower quadrant, and right upper quadrant. Labs are grossly unremarkable, no leukocytosis and lactic is normal.  CT scan grossly unremarkable, no signs of appendicitis. There is complex cystic lesion adjacent to the cecum noted on CT scan, however this was also visible 2 years ago when she had a CT scan also unchanged from that time. Pelvic ultrasound was then ordered for further evaluation to rule out ovarian etiology. Pelvic ultrasound pending at end of shift. Care transferred to [facility] at end of shift and will dispo patient. Amount and/or Complexity of Data Reviewed  Clinical lab tests: ordered and reviewed  Tests in the radiology section of CPT®: ordered and reviewed  Tests in the medicine section of CPT®: ordered  Review and summarize past medical records: yes    Patient Progress  Patient progress: stable    ED Course as of 03/09/22 2138   Wed Mar 09, 2022   [address] [place] [address] Pregnancy test,urine (POC): Negative [KE]   2026 Requesting additional pain medication. Reevaluated patient she continues to have some right lower quadrant pain. CT scan relatively unremarkable, appendix is normal.  Pelvic ultrasound ordered. [KE]   2027 Narrative & Impression  EXAMINATION: CT ABD PELV W CONT 3/9/2022 7:43 PM     ACCESSION NUMBER:  [id number]     COMPARISON: None available     INDICATION:  Periumbilical pain that migrated to RLQ today, nausea and vomiting     TECHNIQUE: Contiguous axial computed tomographic images were obtained from the  domes of the diaphragm to the symphysis pubis following administration 100 mL  Iso-sebastien 370.  Coronal reconstructions were also performed. Oral contrast was also  administered.     Radiation dose reduction techniques were used for this study. Our CT scanners  use one or all of the following: Automated exposure control, adjustment of the  mA and/or kV according to patient size, iterative reconstruction.     FINDINGS:     Lung bases: The visualized lung bases are clear. The visualized portions of the  heart are unremarkable.     Liver: Normal     Gallbladder: There is prior cholecystectomy.     Spleen: Normal     Adrenals: Normal     Pancreas: Normal     Kidneys: The kidneys enhance symmetrically. There is no hydronephrosis.     Bladder/: Normal     Bowel: Again seen is a rounded low-density cystic lesion adjacent to the medial  aspect of the cecum. This is unchanged from the prior exam. It likely has a  small mural nodule. The appendix is normal.     Vessels: Normal     Abdominal wall: Intact     Bones: No suspicious lytic or blastic bony lesions.     IMPRESSION  1. No acute explanation for the patient's symptoms.     2.  Stable complex cystic lesion adjacent to the cecum. A cystic neoplasm is the  primary consideration.  [KE]      ED Course User Index  [KE] Deysi [facility]

## 2022-03-10 NOTE — PROGRESS NOTES
OPERATIVE NOTE:   Preop Dx: Right ovarian torsion, RLQ pain  Postop Dx: Normal uterus, normal left tube and ovary, right ovary and tube not found  Procedure: Exploratory laparoscopy, lysis of adhesions, exploratory laparotomy  Surgeon: Dr. Ila Obrien  EBL <50mL  Anesthesia GETA  Complications None  Specimen None  Patient condition Stable    Kaiser Paiz MD

## 2022-03-10 NOTE — ANESTHESIA POSTPROCEDURE EVALUATION
Procedure(s):  LAPAROSCOPY GYN DIAGNOSTIC.    general    Anesthesia Post Evaluation      Multimodal analgesia: multimodal analgesia used between 6 hours prior to anesthesia start to PACU discharge  Patient location during evaluation: bedside  Patient participation: complete - patient participated  Level of consciousness: awake  Pain management: adequate  Airway patency: patent  Anesthetic complications: no  Cardiovascular status: acceptable  Respiratory status: spontaneous ventilation and acceptable  Hydration status: acceptable  Comments: Continuous pulse oximetry ordered for the floor.   Post anesthesia nausea and vomiting:  none      INITIAL Post-op Vital signs:   Vitals Value Taken Time   /91 03/10/22 0400   Temp 36.7 °C (98 °F) 03/10/22 0310   Pulse 98 03/10/22 0400   Resp 15 03/10/22 0400   SpO2 94 % 03/10/22 0400

## 2022-03-10 NOTE — H&P
Gynecology History and Physical    Name: Cleo Sandoval MRN: [id number] SSN: [id number]    YOB: 1985  Age: 29 y.o. Sex: female       Subjective:      Chief complaint:   RLQ pain    30 yo [id number] presented to the ED with RLQ pain. She was evaluated to include PE, labs, CT, and pelvic US. CT suggested a cystic lesion near the cecum. A pelvic US then showed an 8cm avascular cystic structure in the right adnexa suggestive of right ovarian torsion. OB/GYN was consulted. The pt reports continued RLQ pain. Onset of pain was this morning. The pain worsened throughout the day and she developed nausea with 1 episode of vomiting. She reports a similar episode of pain 1 month ago which resolved. She denies vaginal bleeding. Her pregnancy test was negative. The current method of family planning is none. OB History    No obstetric history on file. 3 term SVDs, 1 SAB    Past Medical History:   Diagnosis Date    Anxiety     Depression     GERD (gastroesophageal reflux disease)    Morbid obesity    Past Surgical History:   Procedure Laterality Date    HX CHOLECYSTECTOMY      HX GYN      c section    HX HEENT       Social History     Occupational History    Not on file   Tobacco Use    Smoking status: Never Smoker    Smokeless tobacco: Never Used   Substance and Sexual Activity    Alcohol use: No    Drug use: No    Sexual activity: Yes     Partners: Male     History reviewed. No pertinent family history. Allergies   Allergen Reactions    Codeine Unknown (comments)    Keflex [Cephalexin] Nausea and Vomiting    Morphine Other (comments)     \"burn all over\"    Pcn [Penicillins] Rash     Prior to Admission medications    Medication Sig Start Date End Date Taking? Authorizing Provider   hyoscyamine SL (Levsin/SL) 0.125 mg SL tablet 1 Tab by SubLINGual route every four (4) hours as needed for Cramping.  11/12/20   Aron Chandler MD   albuterol (PROVENTIL HFA, VENTOLIN HFA, PROAIR HFA) 90 mcg/actuation inhaler Take 2 Puffs by inhalation every four (4) hours as needed. 3/20/20   Mao Foster MD   cholecalciferol (VITAMIN D3) (2,000 UNITS /50 MCG) cap capsule Take 2,000 Units by mouth daily. 3/20/20   Mao Foster MD   ondansetron (Zofran ODT) 8 mg disintegrating tablet Take 1 Tab by mouth every eight (8) hours as needed for Nausea. 5/28/20   Escobar Cadena MD   montelukast (SINGULAIR) 10 mg tablet Take 10 mg by mouth daily. Mao Foster MD   clonazePAM (KLONOPIN) 1 mg tablet Take  by mouth two (2) times a day. Indications: PANIC DISORDER    Mao Foster MD    Prozac  \"Acid reducer\"     Review of Systems:  Pertinent items are noted in the History of Present Illness. 10 point ROS is otherwise negative. Objective:     Vitals:    03/09/22 1802 03/09/22 2000   BP: (!) 153/97 (!) 148/90   Pulse: 82 76   Resp: 16 16   Temp: 98 °F (36.7 °C)    SpO2: 98% 97%   Weight: 149.7 kg (330 lb)    Height: 5' 2\" (1.575 m)        Physical Exam:  Patient without distress. Heart: Regular rate and rhythm  Lung: clear to auscultation throughout lung fields and normal respiratory effort  Abdomen: soft, tender to palpation at the umbilicus and RLQ, neg rebound, +voluntary gaurding  Exts: 1+ BLE edeam    Labs/Rads:   Study Result    Narrative & Impression   EXAM: US PELV NON OB W TV     HISTORY: RLQ pain.      TECHNIQUE: Grayscale sonographic evaluation of the pelvis supplemented with  color and spectral Doppler as needed. Transabdominal and transvaginal images  were obtained.     COMPARISON: 5/28/2020     FINDINGS:   This examination was limited secondary to bowel gas and the patient's body  habitus.     The uterus appears anteverted and measures 12.7 x 5.4 x 6.0 cm. Multiple  nabothian cysts are seen. The largest measures up to 1.7 cm. Otherwise the  myometrium is homogeneous.      Endometrium measures 6.7 mm.     A definitive right ovary was not seen.  The transabdominal images show an 8.6 x  7.8 x 5.5 cm avascular cystic structure in the right adnexa.     Left ovary measures 5.8 x 2.4 x 3.0 cm.      The left ovary shows venous flow signal. Arterial flow was not detected.     There is trace free fluid within the posterior cul-de-sac.     IMPRESSION  This examination was quite limited.     A definitive right ovary was not seen. The transabdominal images show an 8.6 x  7.8 x 5.5 cm avascular cystic structure in the right adnexa. A TORSED OVARY IS  NOT DEFINITIVELY EXCLUDED.     The left ovary was seen. Venous flow was noted. Arterial flow was not detected.     Other findings as above. Study Result    Narrative & Impression   EXAMINATION: CT ABD PELV W CONT 3/9/2022 7:43 PM     ACCESSION NUMBER:  [id number]     COMPARISON: None available     INDICATION:  Periumbilical pain that migrated to RLQ today, nausea and vomiting     TECHNIQUE: Contiguous axial computed tomographic images were obtained from the  domes of the diaphragm to the symphysis pubis following administration 100 mL  Iso-sebastien 370. Coronal reconstructions were also performed. Oral contrast was also  administered.     Radiation dose reduction techniques were used for this study. Our CT scanners  use one or all of the following: Automated exposure control, adjustment of the  mA and/or kV according to patient size, iterative reconstruction.     FINDINGS:     Lung bases: The visualized lung bases are clear. The visualized portions of the  heart are unremarkable.     Liver: Normal     Gallbladder: There is prior cholecystectomy.     Spleen: Normal     Adrenals: Normal     Pancreas: Normal     Kidneys: The kidneys enhance symmetrically. There is no hydronephrosis.     Bladder/: Normal     Bowel: Again seen is a rounded low-density cystic lesion adjacent to the medial  aspect of the cecum. This is unchanged from the prior exam. It likely has a  small mural nodule.   The appendix is normal.     Vessels: Normal     Abdominal wall: Intact     Bones: No suspicious lytic or blastic bony lesions.     IMPRESSION  1. No acute explanation for the patient's symptoms.     2.  Stable complex cystic lesion adjacent to the cecum. A cystic neoplasm is the  primary consideration. Results for Rupert Dasilva (MRN [id number]) as of 3/9/2022 23:45   Ref. Range 3/9/2022 18:18 3/9/2022 18:24 3/9/2022 18:49   Pregnancy test,urine (POC) Latest Ref Range: NEG   Negative     WBC Latest Ref Range: 4.3 - 11.1 K/uL   10.8   RBC Latest Ref Range: 4.05 - 5.2 M/uL   4.36   HGB Latest Ref Range: 11.7 - 15.4 g/dL   12.5   HCT Latest Ref Range: 35.8 - 46.3 %   38.6   MCV Latest Ref Range: 79.6 - 97.8 FL   88.5   MCH Latest Ref Range: 26.1 - 32.9 PG   28.7   MCHC Latest Ref Range: 31.4 - 35.0 g/dL   32.4   RDW Latest Ref Range: 11.9 - 14.6 %   14.2   PLATELET Latest Ref Range: 150 - 450 K/uL   439   MPV Latest Ref Range: 9.4 - 12.3 FL   10.5   NEUTROPHILS Latest Ref Range: 43 - 78 %   64   LYMPHOCYTES Latest Ref Range: 13 - 44 %   29   MONOCYTES Latest Ref Range: 4.0 - 12.0 %   5   EOSINOPHILS Latest Ref Range: 0.5 - 7.8 %   1   BASOPHILS Latest Ref Range: 0.0 - 2.0 %   0   IMMATURE GRANULOCYTES Latest Ref Range: 0.0 - 5.0 %   1   DF Latest Units:     AUTOMATED   ABSOLUTE NRBC Latest Ref Range: 0.0 - 0.2 K/uL   0.00   ABS. NEUTROPHILS Latest Ref Range: 1.7 - 8.2 K/UL   6.9   ABS. IMM. GRANS. Latest Ref Range: 0.0 - 0.5 K/UL   0.1   ABS. LYMPHOCYTES Latest Ref Range: 0.5 - 4.6 K/UL   3.1   ABS. MONOCYTES Latest Ref Range: 0.1 - 1.3 K/UL   0.6   ABS. EOSINOPHILS Latest Ref Range: 0.0 - 0.8 K/UL   0.1   ABS.  BASOPHILS Latest Ref Range: 0.0 - 0.2 K/UL   0.0   Epithelial cells Latest Ref Range: 0 /hpf  3-5    Mucus Latest Ref Range: 0 /lpf  1+ (H)    WBC Latest Ref Range: 0 /hpf  0-3    RBC Latest Ref Range: 0 /hpf  0-3    Bacteria Latest Ref Range: 0 /hpf  TRACE    Crystals, urine Latest Ref Range: 0 /LPF  0    Casts Latest Ref Range: 0 /lpf  0    Other observations Latest Units:    RESULTS VERIFIED MANUALLY    Sodium Latest Ref Range: 136 - 145 mmol/L   141   Potassium Latest Ref Range: 3.5 - 5.1 mmol/L   3.1 (L)   Chloride Latest Ref Range: 98 - 107 mmol/L   108 (H)   CO2 Latest Ref Range: 21 - 32 mmol/L   28   Anion gap Latest Ref Range: 7 - 16 mmol/L   5 (L)   Glucose Latest Ref Range: 65 - 100 mg/dL   91   BUN Latest Ref Range: 6 - 23 MG/DL   10   Creatinine Latest Ref Range: 0.6 - 1.0 MG/DL   0.87   Calcium Latest Ref Range: 8.3 - 10.4 MG/DL   9.1   GFR est non-AA Latest Ref Range: >60 ml/min/1.73m2   >60   GFR est AA Latest Ref Range: >60 ml/min/1.73m2   >60   Bilirubin, total Latest Ref Range: 0.2 - 1.1 MG/DL   0.2   Protein, total Latest Ref Range: 6.3 - 8.2 g/dL   7.8   Albumin Latest Ref Range: 3.5 - 5.0 g/dL   3.2 (L)   Globulin Latest Ref Range: 2.3 - 3.5 g/dL   4.6 (H)   A-G Ratio Latest Ref Range: 1.2 - 3.5     0.7 (L)   ALT Latest Ref Range: 12 - 65 U/L   21   AST Latest Ref Range: 15 - 37 U/L   13 (L)   Alk. phosphatase Latest Ref Range: 50 - 136 U/L   99   Lipase Latest Ref Range: 73 - 393 U/L   140   Lactic acid Latest Ref Range: 0.4 - 2.0 MMOL/L   1.6   CULTURE, URINE Unknown  Rpt        Assessment: 30 yo  presents with RLQ pain likely due to right ovarian torsion. Plan: Exploratory laparoscopy with right oopherectomy and possible laparotomy. R/B/A were reviewed to include r/o bleeding, infection, injury to surrounding structures and postop complications. All questions were answered and consents were signed. Due to morbid obesity, reviewed high risk of conversion to laparotomy. Discussed the risks of surgery including the risks of bleeding, infection, deep vein thrombosis, and surgical injuries to internal organs including but not limited to the bowels, bladder, rectum, and female reproductive organs. The patient understands the risks; any and all questions were answered to the patient's satisfaction.

## 2022-03-10 NOTE — ED NOTES
I took over patient's care for [facility] at shift change to follow-up on ultrasound results. Her ultrasound is showing an 8 x 6 x 7 0.8 x 5.5 cm avascular cystic structure in the right adnexa and the radiologist cannot definitively see her right ovary or blood flow in ovarian torsion cannot be ruled out. I initially called the on-call GYN physician Dr. Nellie Valenzuela and spoke to him and he stated that I needed to contact the [facility] hospitalist.  I then spoke to Dr. Tesfaye France around 10:45 PM who asked if the patient was established with an OB/GYN already. I talked to the patient and she stated to me that a year and a half ago she had a miscarriage and was managed by [facility].   I then called the on-call physician Dr. Asif Newman for this group and spoke to him at [phone] and he will work out which physician will take the patient to the OR [facility] for probable ovarian torsion no previous reaction

## 2022-03-10 NOTE — PERIOP NOTES
PM check - OB Gynecology Post Operative Progress Note    Patient doing well post-op day 0 from Procedure(s):  LAPAROSCOPY GYN DIAGNOSTIC WITH CONVERSION TO LAPAROTOMY without significant complaints. Pain controlled on current medication. Schafer out. Patient is not passing flatus. She denies any CP or SOB. No Vb. Is ambulating to rest room. Feeling some abdominal cramping pain. Vitals:  Blood pressure 119/68, pulse 92, temperature 98 °F (36.7 °C), resp. rate 18, height 5' 2\" (1.575 m), weight 149.7 kg (330 lb), SpO2 97 %. Temp (24hrs), Av.1 °F (36.7 °C), Min:97.7 °F (36.5 °C), Max:98.7 °F (37.1 °C)        Exam:  Patient without distress. Abdomen soft,  nontender. Incision dry and clean without erythema. Lower extremities are negative for swelling, cords, or tenderness. Lab/Data Review:  No new labs    Assessment and Plan:  Patient appears to be having uncomplicated post Procedure(s):  LAPAROSCOPY GYN DIAGNOSTIC course. Continue routine post-op care. POD 0 s/p laparoscopy with conversion to laparotomy. 1. See Dr. Alison Cline previous discussion regarding operative findings. Pt to follow up with Dr. Raheel Suazo as an outpatient and will be referred to general surgery for additional imaging and consultation. 2. Postoperative pain currently controlled. Pt previously tolerated PO Oxycodone despite allergy to morphine. Will need to be discharge with oxycodone and motrin for pain control when appropriate for discharge. 3. SCDs in place, encouraged IS. Importance of ambulation discussed to prevent postop DVT. Will start prophylactic dose anticoagulation tonight and continue for the duration of her inpatient stay.    4.  FEN - hypokalemia on 3/9/22, replacement protocol ordered, check BMP in am

## 2022-03-10 NOTE — ED NOTES
Pt daughter willing to pick her up if ride is needed post-surgery.  is secondary contact.     [place] - daughter  [phone]    Maevegayla Turpin -   [phone]

## 2022-03-10 NOTE — PERIOP NOTES
TRANSFER - OUT REPORT:    Verbal report given to Keli Orr on [employer]  being transferred to [facility] for routine progression of care       Report consisted of patients Situation, Background, Assessment and   Recommendations(SBAR). Information from the following report(s) SBAR, OR Summary, Procedure Summary, Intake/Output, Cardiac Rhythm sinus rhythm/sinus tachy and Procedure Verification was reviewed with the receiving nurse. Lines:   Peripheral IV 03/09/22 Anterior;Proximal;Right Forearm (Active)   Site Assessment Clean, dry, & intact 03/10/22 0340   Phlebitis Assessment 0 03/10/22 0340   Infiltration Assessment 0 03/10/22 0340   Dressing Status Clean, dry, & intact 03/10/22 0340   Dressing Type Tape;Transparent 03/10/22 0340   Hub Color/Line Status Blue 03/10/22 0340       Peripheral IV 03/09/22 Left Antecubital (Active)   Site Assessment Clean, dry, & intact 03/10/22 0340   Phlebitis Assessment 0 03/10/22 0340   Infiltration Assessment 0 03/10/22 0340   Dressing Status Clean, dry, & intact 03/10/22 0340   Dressing Type Tape;Transparent 03/10/22 0340   Hub Color/Line Status Pink 03/10/22 0340       Peripheral IV 03/10/22 Right Hand (Active)   Site Assessment Clean, dry, & intact 03/10/22 0340   Phlebitis Assessment 0 03/10/22 0340   Infiltration Assessment 0 03/10/22 0340   Dressing Status Clean, dry, & intact 03/10/22 0340   Dressing Type Tape;Transparent 03/10/22 0340   Hub Color/Line Status Green 03/10/22 0340        Opportunity for questions and clarification was provided.       Patient transported with:   O2 @ 3 liters  Registered Nurse

## 2022-03-10 NOTE — ANESTHESIA PREPROCEDURE EVALUATION
Anesthetic History   No history of anesthetic complications            Review of Systems / Medical History  Patient summary reviewed and pertinent labs reviewed    Pulmonary  Within defined limits        Undiagnosed apnea         Neuro/Psych         Psychiatric history (Anxiety and Depression)     Cardiovascular  Within defined limits                Exercise tolerance: >4 METS     GI/Hepatic/Renal     GERD           Endo/Other        Morbid obesity (Supramorbid Obesity)     Other Findings              Physical Exam    Airway  Mallampati: II  TM Distance: 4 - 6 cm  Neck ROM: normal range of motion, short neck   Mouth opening: Normal     Cardiovascular  Regular rate and rhythm,  S1 and S2 normal,  no murmur, click, rub, or gallop          Pertinent negatives: No murmur   Dental  No notable dental hx       Pulmonary  Breath sounds clear to auscultation               Abdominal  GI exam deferred       Other Findings            Anesthetic Plan    ASA: 3, emergent  Anesthesia type: general          Induction: Intravenous and RSI  Anesthetic plan and risks discussed with: Patient

## 2022-03-10 NOTE — ED NOTES
Dr. DUKE/ Guy Molina called back at 11:04 PM and notified me that the [facility] hospitalist Dr. Helen Arshad will come down to the ER to see the patient and take her to the operating room

## 2022-03-10 NOTE — PROGRESS NOTES
Gynecology Progress Note    Patient doing well post-op day 0 from Procedure(s):  LAPAROSCOPY GYN DIAGNOSTIC WITH CONVERSION TO LAPAROTOMY without significant complaints. Pain controlled on current medication. Schafer to gravity. Patient is not passing flatus. Vitals:  Blood pressure (!) 116/90, pulse (!) 109, temperature 97.9 °F (36.6 °C), resp. rate 16, height 5' 2\" (1.575 m), weight 149.7 kg (330 lb), SpO2 95 %. Temp (24hrs), Av °F (36.7 °C), Min:97.7 °F (36.5 °C), Max:98.6 °F (37 °C)        Exam:  Patient without distress. Abdomen soft, appropriately tender. Incision dry and clean without erythema and intact. Lower extremities are negative for swelling, cords, or tenderness. Lab/Data Review:  CBC:   Lab Results   Component Value Date/Time    WBC 28.8 (H) 03/10/2022 04:42 AM    HGB 12.2 03/10/2022 04:42 AM    HCT 38.4 03/10/2022 04:42 AM     03/10/2022 04:42 AM       Assessment and Plan:  POD 0 s/p laparoscopy with conversion to laparotomy. 1. Case findings and pictures were reviewed with the pt. No right ovary or tube was seen in the pelvis. No right ovary was able to be found s/p laparotomy. An 8cm cystic lesion was not found. No evidence of right ovarian torsion based on operative findings. Pt to follow up with Dr. Lelia Edwards as an outpatient and will be referred to general surgery for additional imaging and consultation. 2. Postoperative pain currently controlled. Pt previously tolerated PO Oxycodone despite allergy to morphine. Will need to be discharge with oxycodone and motrin for pain control when appropriate for discharge. 3. Importance of ambulation discussed to prevent postop DVT. Will start prophylactic dose anticoagulation tonight and continue for the duration of her inpatient stay.

## 2022-03-10 NOTE — ED NOTES
Pt belongings at bedside - shirt, pants, underwear, bra, shoes, backpack, 3 rings in container, R/L contacts in containers

## 2022-03-10 NOTE — PROGRESS NOTES
Patient received as an admission from PACU via stretcher to room 311. Patient awake, drowsy. Orientation to room/unit given. Admission database/assessment completed. Patient offers no complaints. Bed in low position, call bell in place. Will monitor.      03/10/22 0405   Dual Skin Pressure Injury Assessment   Dual Skin Pressure Injury Assessment WDL   Second Care Provider (Based on [address] [place] [address]) Alexis Masters RN   Skin Integumentary   Skin Integumentary (WDL) X   Skin Integrity Incision (comment)  ( abdomen (lap sites x2, transverse incision))

## 2022-03-11 LAB
ANION GAP SERPL CALC-SCNC: 4 MMOL/L (ref 7–16)
BACTERIA SPEC CULT: NORMAL
BACTERIA SPEC CULT: NORMAL
BUN SERPL-MCNC: 6 MG/DL (ref 6–23)
CALCIUM SERPL-MCNC: 8.3 MG/DL (ref 8.3–10.4)
CHLORIDE SERPL-SCNC: 107 MMOL/L (ref 98–107)
CO2 SERPL-SCNC: 28 MMOL/L (ref 21–32)
CREAT SERPL-MCNC: 0.64 MG/DL (ref 0.6–1)
ERYTHROCYTE [DISTWIDTH] IN BLOOD BY AUTOMATED COUNT: 14.8 % (ref 11.9–14.6)
GLUCOSE SERPL-MCNC: 104 MG/DL (ref 65–100)
HCT VFR BLD AUTO: 34.3 % (ref 35.8–46.3)
HGB BLD-MCNC: 10.8 G/DL (ref 11.7–15.4)
MCH RBC QN AUTO: 28.6 PG (ref 26.1–32.9)
MCHC RBC AUTO-ENTMCNC: 31.5 G/DL (ref 31.4–35)
MCV RBC AUTO: 90.7 FL (ref 79.6–97.8)
NRBC # BLD: 0 K/UL (ref 0–0.2)
PLATELET # BLD AUTO: 366 K/UL (ref 150–450)
PMV BLD AUTO: 10.7 FL (ref 9.4–12.3)
POTASSIUM SERPL-SCNC: 3.3 MMOL/L (ref 3.5–5.1)
RBC # BLD AUTO: 3.78 M/UL (ref 4.05–5.2)
SERVICE CMNT-IMP: NORMAL
SODIUM SERPL-SCNC: 139 MMOL/L (ref 136–145)
WBC # BLD AUTO: 12.7 K/UL (ref 4.3–11.1)

## 2022-03-11 PROCEDURE — 74011250637 HC RX REV CODE- 250/637: Performed by: OBSTETRICS & GYNECOLOGY

## 2022-03-11 PROCEDURE — 74011000250 HC RX REV CODE- 250: Performed by: OBSTETRICS & GYNECOLOGY

## 2022-03-11 PROCEDURE — 74011250636 HC RX REV CODE- 250/636: Performed by: OBSTETRICS & GYNECOLOGY

## 2022-03-11 PROCEDURE — 80048 BASIC METABOLIC PNL TOTAL CA: CPT

## 2022-03-11 PROCEDURE — 2709999900 HC NON-CHARGEABLE SUPPLY

## 2022-03-11 PROCEDURE — 65270000029 HC RM PRIVATE

## 2022-03-11 PROCEDURE — 36415 COLL VENOUS BLD VENIPUNCTURE: CPT

## 2022-03-11 PROCEDURE — 74011000250 HC RX REV CODE- 250: Performed by: EMERGENCY MEDICINE

## 2022-03-11 PROCEDURE — 85027 COMPLETE CBC AUTOMATED: CPT

## 2022-03-11 RX ORDER — MELATONIN
2000 DAILY
Status: DISCONTINUED | OUTPATIENT
Start: 2022-03-12 | End: 2022-03-12 | Stop reason: HOSPADM

## 2022-03-11 RX ORDER — FLUOXETINE HYDROCHLORIDE 20 MG/1
60 CAPSULE ORAL DAILY
Status: DISCONTINUED | OUTPATIENT
Start: 2022-03-11 | End: 2022-03-12 | Stop reason: HOSPADM

## 2022-03-11 RX ORDER — CLONAZEPAM 1 MG/1
1 TABLET ORAL
Status: DISCONTINUED | OUTPATIENT
Start: 2022-03-11 | End: 2022-03-12 | Stop reason: HOSPADM

## 2022-03-11 RX ORDER — HYDROMORPHONE HYDROCHLORIDE 2 MG/1
2 TABLET ORAL
Status: DISCONTINUED | OUTPATIENT
Start: 2022-03-11 | End: 2022-03-12 | Stop reason: HOSPADM

## 2022-03-11 RX ORDER — DOCUSATE SODIUM 100 MG/1
100 CAPSULE, LIQUID FILLED ORAL 2 TIMES DAILY
Status: DISCONTINUED | OUTPATIENT
Start: 2022-03-11 | End: 2022-03-12 | Stop reason: HOSPADM

## 2022-03-11 RX ORDER — PANTOPRAZOLE SODIUM 40 MG/1
40 TABLET, DELAYED RELEASE ORAL
Status: DISCONTINUED | OUTPATIENT
Start: 2022-03-12 | End: 2022-03-12 | Stop reason: HOSPADM

## 2022-03-11 RX ORDER — IBUPROFEN 800 MG/1
800 TABLET ORAL
Status: DISCONTINUED | OUTPATIENT
Start: 2022-03-11 | End: 2022-03-12 | Stop reason: HOSPADM

## 2022-03-11 RX ADMIN — POTASSIUM BICARBONATE 40 MEQ: 782 TABLET, EFFERVESCENT ORAL at 09:37

## 2022-03-11 RX ADMIN — ENOXAPARIN SODIUM 40 MG: 100 INJECTION SUBCUTANEOUS at 05:53

## 2022-03-11 RX ADMIN — HYDROMORPHONE HYDROCHLORIDE 2 MG: 2 TABLET ORAL at 21:14

## 2022-03-11 RX ADMIN — FLUOXETINE HYDROCHLORIDE 60 MG: 20 CAPSULE ORAL at 13:00

## 2022-03-11 RX ADMIN — HYDROMORPHONE HYDROCHLORIDE 2 MG: 2 TABLET ORAL at 13:23

## 2022-03-11 RX ADMIN — SODIUM CHLORIDE, PRESERVATIVE FREE 10 ML: 5 INJECTION INTRAVENOUS at 20:53

## 2022-03-11 RX ADMIN — DOCUSATE SODIUM 100 MG: 100 CAPSULE ORAL at 13:23

## 2022-03-11 RX ADMIN — POTASSIUM BICARBONATE 40 MEQ: 782 TABLET, EFFERVESCENT ORAL at 18:00

## 2022-03-11 RX ADMIN — HYDROMORPHONE HYDROCHLORIDE 1 MG: 1 INJECTION, SOLUTION INTRAMUSCULAR; INTRAVENOUS; SUBCUTANEOUS at 07:46

## 2022-03-11 RX ADMIN — HYDROMORPHONE HYDROCHLORIDE 2 MG: 2 TABLET ORAL at 17:13

## 2022-03-11 RX ADMIN — SODIUM CHLORIDE, PRESERVATIVE FREE 10 ML: 5 INJECTION INTRAVENOUS at 05:54

## 2022-03-11 RX ADMIN — DOCUSATE SODIUM 100 MG: 100 CAPSULE ORAL at 17:13

## 2022-03-11 RX ADMIN — KETOROLAC TROMETHAMINE 30 MG: 30 INJECTION, SOLUTION INTRAMUSCULAR at 02:15

## 2022-03-11 RX ADMIN — ENOXAPARIN SODIUM 40 MG: 100 INJECTION SUBCUTANEOUS at 17:14

## 2022-03-11 NOTE — PROGRESS NOTES
Patient received resting in bed. Shift assessment completed. Patient offers no complaints, will monitor.

## 2022-03-11 NOTE — PROGRESS NOTES
Pt voided 325ml clear yellow urine. Pt states she feels like she is having some bleeding, none noted with urination.

## 2022-03-11 NOTE — PROGRESS NOTES
Gynecology Progress Note    Patient doing well post-op day 1 from Procedure(s):  LAPAROSCOPY GYN DIAGNOSTIC with conversion to laparotomy. Patient reports pain that is controlled on current IV medication. Voiding without difficulty. Patient is ambulating and passing flatus. She is also tolerating a regular diet. Vitals:  Blood pressure 135/75, pulse 92, temperature 98 °F (36.7 °C), resp. rate 16, height 5' 2\" (1.575 m), weight 149.7 kg (330 lb), SpO2 92 %. Temp (24hrs), Av.3 °F (36.8 °C), Min:98 °F (36.7 °C), Max:98.5 °F (36.9 °C)        Exam:  Patient without distress. Abdomen soft,  Appropriately tender. Dressing clean dry and intact                 Lower extremities are negative for swelling, cords, or tenderness. Lab/Data Review: All lab results for the last 24 hours reviewed. Assessment and Plan:    Hypokalemia noted. IV replacement ordered. Recheck labs in AM.   IVF to INT  Transition to oral medication. Patient appears to be having uncomplicated post Procedure(s):  LAPAROSCOPY GYN DIAGNOSTIC course. Continue routine post-op care.

## 2022-03-11 NOTE — PROGRESS NOTES
Care Management Interventions  PCP Verified by CM: Yes (Dr. Arun Smith)  Transition of Care Consult (CM Consult): Discharge Planning  Support Systems: Spouse/Significant Other ([facility] #[phone])  Confirm Follow Up Transport: Family  The Patient and/or Patient Representative was Provided with a Choice of Provider and Agrees with the Discharge Plan?: Yes  Freedom of Choice List was Provided with Basic Dialogue that Supports the Patient's Individualized Plan of Care/Goals, Treatment Preferences and Shares the Quality Data Associated with the Providers?: Yes  Discharge Location  Patient Expects to be Discharged to[de-identified] Home  Visited with pt to establish prior to admission baseline and discuss their anticipated goals at time of d/c. Pt lives at home with spouse/Antonio and 3 children, she is inde with ADL's, current with PCP and Rx are filled at [facility] in [place]. No needs and CM to follow.

## 2022-03-11 NOTE — PROGRESS NOTES
03/10/22 2044   Oxygen Therapy   O2 Sat (%) 95 %   Pulse via Oximetry 98 beats per minute   O2 Device None (Room air)   O2 Flow Rate (L/min) 0 l/min   Pt on continuous monitor for HS. Alarm limits set. Pt working on IS.

## 2022-03-12 VITALS
OXYGEN SATURATION: 96 % | HEART RATE: 91 BPM | SYSTOLIC BLOOD PRESSURE: 134 MMHG | RESPIRATION RATE: 17 BRPM | TEMPERATURE: 98.3 F | HEIGHT: 62 IN | WEIGHT: 293 LBS | DIASTOLIC BLOOD PRESSURE: 92 MMHG | BODY MASS INDEX: 53.92 KG/M2

## 2022-03-12 LAB — POTASSIUM SERPL-SCNC: 3.5 MMOL/L (ref 3.5–5.1)

## 2022-03-12 PROCEDURE — 74011000250 HC RX REV CODE- 250: Performed by: EMERGENCY MEDICINE

## 2022-03-12 PROCEDURE — 36415 COLL VENOUS BLD VENIPUNCTURE: CPT

## 2022-03-12 PROCEDURE — 74011250637 HC RX REV CODE- 250/637: Performed by: OBSTETRICS & GYNECOLOGY

## 2022-03-12 PROCEDURE — 84132 ASSAY OF SERUM POTASSIUM: CPT

## 2022-03-12 PROCEDURE — 74011250636 HC RX REV CODE- 250/636: Performed by: OBSTETRICS & GYNECOLOGY

## 2022-03-12 RX ORDER — HYDROMORPHONE HYDROCHLORIDE 2 MG/1
2 TABLET ORAL
Qty: 10 TABLET | Refills: 0 | Status: SHIPPED | OUTPATIENT
Start: 2022-03-12 | End: 2022-03-15

## 2022-03-12 RX ADMIN — ENOXAPARIN SODIUM 40 MG: 100 INJECTION SUBCUTANEOUS at 05:06

## 2022-03-12 RX ADMIN — SODIUM CHLORIDE, PRESERVATIVE FREE 10 ML: 5 INJECTION INTRAVENOUS at 05:07

## 2022-03-12 RX ADMIN — HYDROMORPHONE HYDROCHLORIDE 2 MG: 2 TABLET ORAL at 02:02

## 2022-03-12 RX ADMIN — DOCUSATE SODIUM 100 MG: 100 CAPSULE ORAL at 08:00

## 2022-03-12 RX ADMIN — FLUOXETINE HYDROCHLORIDE 60 MG: 20 CAPSULE ORAL at 08:00

## 2022-03-12 RX ADMIN — HYDROMORPHONE HYDROCHLORIDE 2 MG: 2 TABLET ORAL at 08:00

## 2022-03-12 RX ADMIN — VITAMIN D, TAB 1000IU (100/BT) 2000 UNITS: 25 TAB at 08:00

## 2022-03-12 NOTE — PROGRESS NOTES
Gyn progress note    Subjective:     Patient states pain controlled with oral meds, is ambulating ok and voiding. Tolerating po and passing flatus. Allergies   Allergen Reactions    Codeine Unknown (comments)    Keflex [Cephalexin] Nausea and Vomiting    Morphine Other (comments)     \"burn all over\"    Pcn [Penicillins] Rash          Objective:     Patient Vitals for the past 8 hrs:   BP Temp Pulse Resp SpO2   22 1153 (!) 134/92 98.3 °F (36.8 °C) 91 17 96 %   22 0814 127/86 98.5 °F (36.9 °C) 84 17 92 %     Temp (24hrs), Av.5 °F (36.9 °C), Min:98 °F (36.7 °C), Max:99.1 °F (37.3 °C)    No intake/output data recorded. Physical Exam:   General appearance: alert, cooperative, no distress, appears stated age  Lungs: clear to auscultation bilaterally  Heart: regular rate and rhythm, S1, S2 normal, no murmur, click, rub or gallop  Abdomen: soft, non-tender. Bowel sounds normal. Incision c/d/i without erythema or induration  Pelvic: deferred  Extremities: extremities normal, atraumatic, no cyanosis or edema  Skin: Skin color, texture, turgor normal. No rashes or lesions    Labs:    Recent Results (from the past 24 hour(s))   POTASSIUM    Collection Time: 22  5:41 AM   Result Value Ref Range    Potassium 3.5 3.5 - 5.1 mmol/L         Assessment:   Pod 2 s/p ex lap for suspected ovarian torsion, intra op findings inconclusive and no right adnexal structures seen. Low potassium corrected after supplementation    Plan:     I reviewed with Afua Sandoval her medical records, physical exam, and review of symptoms. D/c to home with follow up with dr. Samantha Aguilera for evaluation and plan for further assessment/consultation. home on script for ocycodone with use of otc ibuprofen  To call dr. Jane Long office for problems  Recommended continued ambulation, incision care, no driving for 2 weeks with pelvic rest.   Keep well hydrated.     Signed By: Beto Latif MD     2022

## 2022-03-12 NOTE — DISCHARGE INSTRUCTIONS
Two weeks pelvic rest, no driving until follow appointment. Contact doctors office for fever, incision problems, recurrent vomiting.

## 2022-03-12 NOTE — PROGRESS NOTES
Received patient awake, alert and oriented in bed watching tv. Denies pain at this time. Abdominal dressing c/d/i. Nursing assessment completed. Bed in low and locked position. Call bell within patient's reach.

## 2022-03-12 NOTE — DISCHARGE SUMMARY
[facility]               GYN DISCHARGE SUMMARY  Patient ID:  Angelita Swann  [id number]  29 y.o.  1985      Admit date and time: 3/9/2022  6:11 PM    Discharge date and time: 3/12/2022, 12:50 PM    Admitting Physician: Liz Wong MD    Discharge Physician: Aba Cruz MD    Admission Diagnoses: Laparoscopic surgical procedure converted to open procedure [Z53.31]    Discharge Diagnoses:   Patient Active Problem List    Diagnosis Date Noted    Laparoscopic surgical procedure converted to open procedure 03/10/2022    Pelvic pain in female 03/10/2022    Pelvic mass in female 03/10/2022    Obesity, morbid ([address] 06/02/2020         Admission Condition: Poor    Discharged Condition: Stable    Admission Presentations: patient presented with acute rlq pain and had right sided pelvic mass thought to be ovarian torsion. Hospital Course: patient was admitted, underwent diagnostic laparoscopy converted to laparotomy. No definitive findings were noted. Patient has had normal postoperative course. Discharge Exam:  See progress note for exam.    Consults:  none    Laboratory Data:    Lab Results   Component Value Date/Time    WBC 12.7 (H) 03/11/2022 03:22 AM    HGB 10.8 (L) 03/11/2022 03:22 AM    HCT 34.3 (L) 03/11/2022 03:22 AM    PLATELET 970 29/48/2618 03:22 AM    MCV 90.7 03/11/2022 03:22 AM     Lab Results   Component Value Date/Time    Sodium 139 03/11/2022 03:22 AM    Potassium 3.5 03/12/2022 05:41 AM    Chloride 107 03/11/2022 03:22 AM    CO2 28 03/11/2022 03:22 AM    Anion gap 4 (L) 03/11/2022 03:22 AM    Glucose 104 (H) 03/11/2022 03:22 AM    BUN 6 03/11/2022 03:22 AM    Creatinine 0.64 03/11/2022 03:22 AM    GFR est AA >60 03/11/2022 03:22 AM    GFR est non-AA >60 03/11/2022 03:22 AM    Calcium 8.3 03/11/2022 03:22 AM    Bilirubin, total 0.2 03/09/2022 06:49 PM    Alk.  phosphatase 99 03/09/2022 06:49 PM    Protein, total 7.8 03/09/2022 06:49 PM    Albumin 3.2 (L) 03/09/2022 06:49 PM Globulin 4.6 (H) 03/09/2022 06:49 PM    A-G Ratio 0.7 (L) 03/09/2022 06:49 PM    ALT (SGPT) 21 03/09/2022 06:49 PM       CT ABD PELV W CONT    Result Date: 3/9/2022  EXAMINATION: CT ABD PELV W CONT 3/9/2022 7:43 PM ACCESSION NUMBER:  [id number] COMPARISON: None available INDICATION:  Periumbilical pain that migrated to RLQ today, nausea and vomiting TECHNIQUE: Contiguous axial computed tomographic images were obtained from the domes of the diaphragm to the symphysis pubis following administration 100 mL Iso-sebastien 370. Coronal reconstructions were also performed. Oral contrast was also administered. Radiation dose reduction techniques were used for this study. Our CT scanners use one or all of the following: Automated exposure control, adjustment of the mA and/or kV according to patient size, iterative reconstruction. FINDINGS: Lung bases: The visualized lung bases are clear. The visualized portions of the heart are unremarkable. Liver: Normal Gallbladder: There is prior cholecystectomy. Spleen: Normal Adrenals: Normal Pancreas: Normal Kidneys: The kidneys enhance symmetrically. There is no hydronephrosis. Bladder/: Normal Bowel: Again seen is a rounded low-density cystic lesion adjacent to the medial aspect of the cecum. This is unchanged from the prior exam. It likely has a small mural nodule. The appendix is normal. Vessels: Normal Abdominal wall: Intact Bones: No suspicious lytic or blastic bony lesions. 1.  No acute explanation for the patient's symptoms. 2.  Stable complex cystic lesion adjacent to the cecum. A cystic neoplasm is the primary consideration. US PELV NON OB W TV    Addendum Date: 3/9/2022    Addendum: This case was discussed with Dr. Pastor Moreno at 9:50 PM on 3/9/2022. He verbalized his understanding. Result Date: 3/9/2022  EXAM: US PELV NON OB W TV HISTORY: RLQ pain. TECHNIQUE: Grayscale sonographic evaluation of the pelvis supplemented with color and spectral Doppler as needed. Transabdominal and transvaginal images were obtained. COMPARISON: 5/28/2020 FINDINGS: This examination was limited secondary to bowel gas and the patient's body habitus. The uterus appears anteverted and measures 12.7 x 5.4 x 6.0 cm. Multiple nabothian cysts are seen. The largest measures up to 1.7 cm. Otherwise the myometrium is homogeneous. Endometrium measures 6.7 mm. A definitive right ovary was not seen. The transabdominal images show an 8.6 x 7.8 x 5.5 cm avascular cystic structure in the right adnexa. Left ovary measures 5.8 x 2.4 x 3.0 cm. The left ovary shows venous flow signal. Arterial flow was not detected. There is trace free fluid within the posterior cul-de-sac. This examination was quite limited. A definitive right ovary was not seen. The transabdominal images show an 8.6 x 7.8 x 5.5 cm avascular cystic structure in the right adnexa. A TORSED OVARY IS NOT DEFINITIVELY EXCLUDED. The left ovary was seen. Venous flow was noted. Arterial flow was not detected. Other findings as above. Disposition:   home    Patient Instructions:   Current Discharge Medication List      START taking these medications    Details   HYDROmorphone (DILAUDID) 2 mg tablet Take 1 Tablet by mouth every eight (8) hours as needed for Pain for up to 3 days. Max Daily Amount: 6 mg. Qty: 10 Tablet, Refills: 0  Start date: 3/12/2022, End date: 3/15/2022    Associated Diagnoses: Pelvic mass in female; Pelvic pain in female; Laparoscopic surgical procedure converted to open procedure         CONTINUE these medications which have NOT CHANGED    Details   FLUoxetine (PROzac) 20 mg capsule Take 60 mg by mouth daily. albuterol (PROVENTIL HFA, VENTOLIN HFA, PROAIR HFA) 90 mcg/actuation inhaler Take 2 Puffs by inhalation every four (4) hours as needed. cholecalciferol (VITAMIN D3) (2,000 UNITS /50 MCG) cap capsule Take 2,000 Units by mouth daily.       ondansetron (Zofran ODT) 8 mg disintegrating tablet Take 1 Tab by mouth every eight (8) hours as needed for Nausea. Qty: 8 Tab, Refills: 1      clonazePAM (KLONOPIN) 1 mg tablet Take  by mouth two (2) times a day. Indications: PANIC DISORDER      hyoscyamine SL (Levsin/SL) 0.125 mg SL tablet 1 Tab by SubLINGual route every four (4) hours as needed for Cramping. Qty: 20 Tab, Refills: 1      montelukast (SINGULAIR) 10 mg tablet Take 10 mg by mouth daily. Activity: Activity as tolerated, No lifting, Driving, or Strenuous exercise for 2 weeks, No sex for 2 weeks and No driving while on analgesics  Diet: Regular Diet  Wound Care: Keep wound clean and dry    Follow-up with Dr. Lynda Talavera  in 2 weeks. I spoke to the patient and spouse in detail at bedside. They were updated on discharge plans and management post discharge. All questions and queries addressed especially regarding prescriptions and associated side effects and they verbalized understanding. I informed them of my availability at all times. RN was updated of the plans.      Total time spent on discharge services today including examining and educating , writing prescriptions, documenting this discharge summary and coordinating the outpatient care and follow up was 30 mins        Electronically signed by:    Janee Reynaga MD MD  [facility] Hospitalist  3/12/2022  12:50 PM

## 2022-03-19 ENCOUNTER — APPOINTMENT (OUTPATIENT)
Dept: CT IMAGING | Age: 37
End: 2022-03-19
Attending: PHYSICIAN ASSISTANT
Payer: MEDICAID

## 2022-03-19 ENCOUNTER — HOSPITAL ENCOUNTER (EMERGENCY)
Age: 37
Discharge: HOME | End: 2022-03-19
Attending: EMERGENCY MEDICINE
Payer: MEDICAID

## 2022-03-19 VITALS
HEART RATE: 100 BPM | HEIGHT: 65 IN | WEIGHT: 293 LBS | DIASTOLIC BLOOD PRESSURE: 70 MMHG | RESPIRATION RATE: 16 BRPM | OXYGEN SATURATION: 99 % | SYSTOLIC BLOOD PRESSURE: 128 MMHG | BODY MASS INDEX: 48.82 KG/M2 | TEMPERATURE: 99.2 F

## 2022-03-19 DIAGNOSIS — T81.49XA POST-OPERATIVE WOUND ABSCESS: Primary | ICD-10-CM

## 2022-03-19 DIAGNOSIS — E66.01 OBESITY, MORBID (HCC): ICD-10-CM

## 2022-03-19 PROBLEM — Z53.31 LAPAROSCOPIC SURGICAL PROCEDURE CONVERTED TO OPEN PROCEDURE: Status: ACTIVE | Noted: 2022-03-10

## 2022-03-19 PROBLEM — R19.00 PELVIC MASS IN FEMALE: Status: ACTIVE | Noted: 2022-03-10

## 2022-03-19 PROBLEM — R10.2 PELVIC PAIN IN FEMALE: Status: ACTIVE | Noted: 2022-03-10

## 2022-03-19 LAB
ALBUMIN SERPL-MCNC: 2.3 G/DL (ref 3.5–5)
ALBUMIN/GLOB SERPL: 0.4 {RATIO} (ref 1.2–3.5)
ALP SERPL-CCNC: 119 U/L (ref 50–130)
ALT SERPL-CCNC: 19 U/L (ref 12–65)
ANION GAP SERPL CALC-SCNC: 8 MMOL/L (ref 7–16)
APPEARANCE UR: ABNORMAL
AST SERPL-CCNC: 14 U/L (ref 15–37)
BACTERIA URNS QL MICRO: ABNORMAL /HPF
BASOPHILS # BLD: 0.1 K/UL (ref 0–0.2)
BASOPHILS NFR BLD: 0 % (ref 0–2)
BILIRUB SERPL-MCNC: 0.8 MG/DL (ref 0.2–1.1)
BILIRUB UR QL: ABNORMAL
BUN SERPL-MCNC: 10 MG/DL (ref 6–23)
CALCIUM SERPL-MCNC: 8.7 MG/DL (ref 8.3–10.4)
CASTS URNS QL MICRO: ABNORMAL /LPF
CHLORIDE SERPL-SCNC: 104 MMOL/L (ref 98–107)
CO2 SERPL-SCNC: 25 MMOL/L (ref 21–32)
COLOR UR: ABNORMAL
CREAT SERPL-MCNC: 1.03 MG/DL (ref 0.6–1)
CRYSTALS URNS QL MICRO: ABNORMAL /LPF
DIFFERENTIAL METHOD BLD: ABNORMAL
EOSINOPHIL # BLD: 0.4 K/UL (ref 0–0.8)
EOSINOPHIL NFR BLD: 2 % (ref 0.5–7.8)
EPI CELLS #/AREA URNS HPF: ABNORMAL /HPF
ERYTHROCYTE [DISTWIDTH] IN BLOOD BY AUTOMATED COUNT: 14.4 % (ref 11.9–14.6)
GLOBULIN SER CALC-MCNC: 5.3 G/DL (ref 2.3–3.5)
GLUCOSE SERPL-MCNC: 115 MG/DL (ref 65–100)
GLUCOSE UR STRIP.AUTO-MCNC: NEGATIVE MG/DL
HCG UR QL: NEGATIVE
HCT VFR BLD AUTO: 34.4 % (ref 35.8–46.3)
HGB BLD-MCNC: 11.1 G/DL (ref 11.7–15.4)
HGB UR QL STRIP: ABNORMAL
IMM GRANULOCYTES # BLD AUTO: 0.1 K/UL (ref 0–0.5)
IMM GRANULOCYTES NFR BLD AUTO: 1 % (ref 0–5)
KETONES UR QL STRIP.AUTO: ABNORMAL MG/DL
LACTATE SERPL-SCNC: 1.4 MMOL/L (ref 0.4–2)
LEUKOCYTE ESTERASE UR QL STRIP.AUTO: ABNORMAL
LYMPHOCYTES # BLD: 1 K/UL (ref 0.5–4.6)
LYMPHOCYTES NFR BLD: 6 % (ref 13–44)
MCH RBC QN AUTO: 28.2 PG (ref 26.1–32.9)
MCHC RBC AUTO-ENTMCNC: 32.3 G/DL (ref 31.4–35)
MCV RBC AUTO: 87.5 FL (ref 79.6–97.8)
MONOCYTES # BLD: 1.6 K/UL (ref 0.1–1.3)
MONOCYTES NFR BLD: 9 % (ref 4–12)
NEUTS SEG # BLD: 15 K/UL (ref 1.7–8.2)
NEUTS SEG NFR BLD: 82 % (ref 43–78)
NITRITE UR QL STRIP.AUTO: NEGATIVE
NRBC # BLD: 0 K/UL (ref 0–0.2)
PH UR STRIP: 5.5 [PH] (ref 5–9)
PLATELET # BLD AUTO: 521 K/UL (ref 150–450)
PMV BLD AUTO: 10.5 FL (ref 9.4–12.3)
POTASSIUM SERPL-SCNC: 3 MMOL/L (ref 3.5–5.1)
PROT SERPL-MCNC: 7.6 G/DL (ref 6.3–8.2)
PROT UR STRIP-MCNC: 30 MG/DL
RBC # BLD AUTO: 3.93 M/UL (ref 4.05–5.2)
RBC #/AREA URNS HPF: ABNORMAL /HPF
SODIUM SERPL-SCNC: 137 MMOL/L (ref 136–145)
SP GR UR REFRACTOMETRY: 1.03 (ref 1–1.02)
UROBILINOGEN UR QL STRIP.AUTO: 4 EU/DL (ref 0.2–1)
WBC # BLD AUTO: 18.2 K/UL (ref 4.3–11.1)
WBC URNS QL MICRO: ABNORMAL /HPF

## 2022-03-19 PROCEDURE — 99285 EMERGENCY DEPT VISIT HI MDM: CPT

## 2022-03-19 PROCEDURE — 74011000636 HC RX REV CODE- 636: Performed by: EMERGENCY MEDICINE

## 2022-03-19 PROCEDURE — 87186 SC STD MICRODIL/AGAR DIL: CPT

## 2022-03-19 PROCEDURE — 74177 CT ABD & PELVIS W/CONTRAST: CPT

## 2022-03-19 PROCEDURE — 87040 BLOOD CULTURE FOR BACTERIA: CPT

## 2022-03-19 PROCEDURE — 74011000258 HC RX REV CODE- 258: Performed by: EMERGENCY MEDICINE

## 2022-03-19 PROCEDURE — 96374 THER/PROPH/DIAG INJ IV PUSH: CPT

## 2022-03-19 PROCEDURE — 87205 SMEAR GRAM STAIN: CPT

## 2022-03-19 PROCEDURE — 87086 URINE CULTURE/COLONY COUNT: CPT

## 2022-03-19 PROCEDURE — 96375 TX/PRO/DX INJ NEW DRUG ADDON: CPT

## 2022-03-19 PROCEDURE — 83605 ASSAY OF LACTIC ACID: CPT

## 2022-03-19 PROCEDURE — 87077 CULTURE AEROBIC IDENTIFY: CPT

## 2022-03-19 PROCEDURE — 74011250636 HC RX REV CODE- 250/636: Performed by: PHYSICIAN ASSISTANT

## 2022-03-19 PROCEDURE — 81001 URINALYSIS AUTO W/SCOPE: CPT

## 2022-03-19 PROCEDURE — 85025 COMPLETE CBC W/AUTO DIFF WBC: CPT

## 2022-03-19 PROCEDURE — 99282 EMERGENCY DEPT VISIT SF MDM: CPT | Performed by: OBSTETRICS & GYNECOLOGY

## 2022-03-19 PROCEDURE — 80053 COMPREHEN METABOLIC PANEL: CPT

## 2022-03-19 PROCEDURE — 81025 URINE PREGNANCY TEST: CPT

## 2022-03-19 RX ORDER — IBUPROFEN 600 MG/1
TABLET ORAL
Qty: 30 TABLET | Refills: 0 | Status: SHIPPED | OUTPATIENT
Start: 2022-03-19 | End: 2022-03-29

## 2022-03-19 RX ORDER — SODIUM CHLORIDE 0.9 % (FLUSH) 0.9 %
10 SYRINGE (ML) INJECTION
Status: COMPLETED | OUTPATIENT
Start: 2022-03-19 | End: 2022-03-19

## 2022-03-19 RX ORDER — METRONIDAZOLE 500 MG/1
500 TABLET ORAL 2 TIMES DAILY
Qty: 14 TABLET | Refills: 0 | Status: SHIPPED | OUTPATIENT
Start: 2022-03-19 | End: 2022-03-26

## 2022-03-19 RX ORDER — ONDANSETRON 2 MG/ML
4 INJECTION INTRAMUSCULAR; INTRAVENOUS
Status: COMPLETED | OUTPATIENT
Start: 2022-03-19 | End: 2022-03-19

## 2022-03-19 RX ORDER — LORAZEPAM 2 MG/ML
1 INJECTION INTRAMUSCULAR
Status: COMPLETED | OUTPATIENT
Start: 2022-03-19 | End: 2022-03-19

## 2022-03-19 RX ADMIN — SODIUM CHLORIDE 100 ML: 9 INJECTION, SOLUTION INTRAVENOUS at 12:16

## 2022-03-19 RX ADMIN — IOPAMIDOL 100 ML: 755 INJECTION, SOLUTION INTRAVENOUS at 12:16

## 2022-03-19 RX ADMIN — Medication 10 ML: at 12:16

## 2022-03-19 RX ADMIN — SODIUM CHLORIDE 1000 ML: 0.9 INJECTION, SOLUTION INTRAVENOUS at 12:44

## 2022-03-19 RX ADMIN — ONDANSETRON 4 MG: 2 INJECTION INTRAMUSCULAR; INTRAVENOUS at 15:03

## 2022-03-19 RX ADMIN — LORAZEPAM 1 MG: 2 INJECTION INTRAMUSCULAR; INTRAVENOUS at 15:02

## 2022-03-19 NOTE — PROGRESS NOTES
Gynecology ED Note    Patient reports drainage from her incision since earlier this week. She is  post-op day 9 from  exploratory laparotomy by Dr Iain Palomo who called Dr Bucky Cramer for assistance in finding questionable right ovarian torsion. No such torsion was identified. She recovered and was discharged. This AM her wound began draining copious purulence. Her pain controlled on current medication. Voiding without difficulty. Patient is passing flatus. Vitals:  Blood pressure 132/80, pulse (!) 110, temperature 98.7 °F (37.1 °C), resp. rate 20, height 5' 5\" (1.651 m), weight 305 lb 8 oz (138.6 kg), last menstrual period 2022, SpO2 97 %. Temp (24hrs), Av.7 °F (37.1 °C), Min:98.7 °F (37.1 °C), Max:98.7 °F (37.1 °C)        Exam:  Patient without distress. Abdomen soft,  nontender. Incision is  clean without erythema but draining in 3 separate areas malodorous purulent drainage. Wound probed, fascia intact as radiographs indicate. No drainage after thorough expression of each fluid collection. Lower extremities are negative for swelling, cords, or tenderness. Lab/Data Review:  CBC:   Lab Results   Component Value Date/Time    WBC 18.2 (H) 2022 10:38 AM    HGB 11.1 (L) 2022 10:38 AM    HCT 34.4 (L) 2022 10:38 AM     (H) 2022 10:38 AM       Assessment and Plan:  Patient appears to have a wound infection. Oral antibiotics are appropriate now that wound has been evacuated. Wound supplies were provided to patient with instructions to change her bandage as directed. She will follow up Monday in the office for wound inspection and likely referral to wound care clinic.

## 2022-03-19 NOTE — DISCHARGE INSTRUCTIONS
You can take ibuprofen as prescribed, take the antibiotic as prescribed and follow-up with the surgeon office as discussed. Return if you develop severely worsening or emergent symptoms.

## 2022-03-19 NOTE — ED NOTES
Pt had gyn surgery on 3/10 for potential ovarian torsion but states she ended up not having torsion. She presents to the ER today due to fever last night. She denies any URI symptoms, vomiting, or diarrhea. Pt also is concerned that since last night she has noticed a lot of drainage from her incision site and is concerned about a potential seroma. Physical exam:  Patient is mildly tachycardic. Abdomen is minimally tender    Patient evaluated initially in triage. Rapid Medical Evaluation was conducted and necessary orders have been placed. I have performed a medical screening exam.  Care will now be transferred to the attending physician in the emergency department.   ANASTACIO Harrington 10:21 AM

## 2022-03-19 NOTE — ED NOTES
I have reviewed discharge instructions with the patient and spouse. The patient and spouse verbalized understanding. Patient left ED via Discharge Method: ambulatory to Home with her  Artem Hull. Opportunity for questions and clarification provided. Patient given 2 scripts. To continue your aftercare when you leave the hospital, you may receive an automated call from our care team to check in on how you are doing. This is a free service and part of our promise to provide the best care and service to meet your aftercare needs.  If you have questions, or wish to unsubscribe from this service please call [phone]. Thank you for Choosing our [facility] Emergency Department.

## 2022-03-19 NOTE — ED PROVIDER NOTES
27-year-old female presents to the emergency room with concern for possible postoperative infection to her abdomen. Patient reports she had an operation performed by Dr. Yasir Mcpherson on 3/10/2022. It was initially planned to be ovarian torsion surgery but turned into an exploratory laparotomy. She says she has been doing well since the surgery but developed seromas to her lower abdomen a few days ago. She says 2 nights ago she spiked a fever that lasted 30 minutes and went away on its own. Then last night she spiked another fever of 103 °F and this morning when she woke up she noticed significant yellow malodorous drainage from the lower abdominal incision site. She had a normal bowel movement yesterday. She reports pain to the lower abdomen, denies any nausea or vomiting. Past Medical History:   Diagnosis Date    Anxiety     Depression     GERD (gastroesophageal reflux disease)        Past Surgical History:   Procedure Laterality Date    HX CHOLECYSTECTOMY      HX GYN      c section    HX HEENT           History reviewed. No pertinent family history. Social History     Socioeconomic History    Marital status: SINGLE     Spouse name: Not on file    Number of children: Not on file    Years of education: Not on file    Highest education level: Not on file   Occupational History    Not on file   Tobacco Use    Smoking status: Never Smoker    Smokeless tobacco: Never Used   Substance and Sexual Activity    Alcohol use: No    Drug use: No    Sexual activity: Yes     Partners: Male   Other Topics Concern    Not on file   Social History Narrative    Not on file     Social Determinants of Health     Financial Resource Strain:     Difficulty of Paying Living Expenses: Not on file   Food Insecurity:     Worried About Running Out of Food in the Last Year: Not on file    Hilario of Food in the Last Year: Not on file   Transportation Needs:     Lack of Transportation (Medical):  Not on file    Lack of Transportation (Non-Medical): Not on file   Physical Activity:     Days of Exercise per Week: Not on file    Minutes of Exercise per Session: Not on file   Stress:     Feeling of Stress : Not on file   Social Connections:     Frequency of Communication with Friends and Family: Not on file    Frequency of Social Gatherings with Friends and Family: Not on file    Attends Gnosticism Services: Not on file    Active Member of [address] [place] [address] or Organizations: Not on file    Attends Club or Organization Meetings: Not on file    Marital Status: Not on file   Intimate Partner Violence:     Fear of Current or Ex-Partner: Not on file    Emotionally Abused: Not on file    Physically Abused: Not on file    Sexually Abused: Not on file   Housing Stability:     Unable to Pay for Housing in the Last Year: Not on file    Number of [place] in the Last Year: Not on file    Unstable Housing in the Last Year: Not on file         ALLERGIES: Codeine, Keflex [cephalexin], Morphine, and Pcn [penicillins]    Review of Systems   Constitutional: Positive for chills and fever. Respiratory: Negative for cough and shortness of breath. Cardiovascular: Negative for chest pain. Gastrointestinal: Positive for abdominal pain. Negative for constipation, nausea and vomiting. Skin: Positive for color change and wound. All other systems reviewed and are negative. Vitals:    03/19/22 1017 03/19/22 1020   BP: 114/71    Pulse: (!) 110    Resp: 20    Temp: 98.7 °F (37.1 °C)    SpO2:  96%   Weight: 138.6 kg (305 lb 8 oz)    Height: 5' 5\" (1.651 m)             Physical Exam  Vitals and nursing note reviewed. Constitutional:       General: She is not in acute distress. Appearance: Normal appearance. She is not ill-appearing, toxic-appearing or diaphoretic. HENT:      Head: Normocephalic and atraumatic.    Eyes:      Conjunctiva/sclera: Conjunctivae normal.   Cardiovascular:      Rate and Rhythm: Normal rate and regular rhythm. Pulses: Normal pulses. Heart sounds: Normal heart sounds. No murmur heard. Pulmonary:      Effort: Pulmonary effort is normal. No respiratory distress. Breath sounds: Normal air entry. No stridor, decreased air movement or transmitted upper airway sounds. No decreased breath sounds or wheezing. Abdominal:      General: Abdomen is flat. There is distension. Tenderness: There is abdominal tenderness in the right lower quadrant and suprapubic area. There is no right CVA tenderness, left CVA tenderness, guarding or rebound. Comments: Large pannus lifted, lower abdominal seromas are noted, tender, incision site right lower abdomen is draining a large amount of dark yellowish/brown fluid actively draining from the site   Skin:     General: Skin is warm and dry. Neurological:      General: No focal deficit present. Mental Status: She is alert and oriented to person, place, and time. Mental status is at baseline. MDM  Number of Diagnoses or Management Options  Post-operative wound abscess: new and requires workup  Diagnosis management comments: Patient's WBC is elevated at 18.2, lactic acid is normal.  Mild hypokalemia noted with potassium of 3, electrolytes otherwise normal.  Kidney function and liver function normal.  Urinalysis shows 1+ bacteria, will send for culture as patient does not have any UTI symptoms at this time. patient was assessed by Dr. Janet Velásquez who drained more of the area and will start patient on antibiotics and have her follow up in the office. She has an appointment scheduled for Monday and will follow up at that time. She understands return precautions in the meantime. ED Course as of 03/19/22 1544   Sat Mar 19, 2022   [id number] [facility] paged [AR]   [phone] CT SCAN:    IMPRESSION  1. Postsurgical changes in the midline ventral wall rectus abdominis today.   Anterior to this in the deep superficial soft tissues there is induration/edema  and a clearly evident collection of air locules associated with this. Cannot  exclude postoperative infection, this could be a phlegmon. No discrete formed,  walled off abscess.     2. No acute intra-abdominal or pelvis finding. A left adnexal follicular cysts  as increased in size from before and is now a slightly prominent, measuring at  up to 3.7 cm. [AR]   6460 Dr. Lynda Talavera called back, will come and assess the patient in the ER.   Patient says she is very anxious and is now nauseous [AR]      ED Course User Index  [AR] [facility]

## 2022-03-22 LAB
BACTERIA SPEC CULT: NORMAL
BACTERIA SPEC CULT: NORMAL
SERVICE CMNT-IMP: NORMAL

## 2022-03-23 LAB
BACTERIA SPEC CULT: ABNORMAL
BACTERIA SPEC CULT: ABNORMAL
GRAM STN SPEC: ABNORMAL
SERVICE CMNT-IMP: ABNORMAL

## 2022-03-24 LAB
BACTERIA SPEC CULT: NORMAL
BACTERIA SPEC CULT: NORMAL
SERVICE CMNT-IMP: NORMAL
SERVICE CMNT-IMP: NORMAL

## 2023-03-09 NOTE — OP NOTES
[address] [address]  OPERATIVE REPORT    Name:  Rojelio Dumont  MR#:  [id number]  :  1985  ACCOUNT #:  [de-identified]  DATE OF SERVICE:  2022    PREOPERATIVE DIAGNOSIS:  Right ovarian torsion. POSTOPERATIVE DIAGNOSES:  1. Normal uterus. 2.  Normal left fallopian tube and ovary. 3.  Right ovary and tube not identified. 4.  Avascular cystic structure not identified. PROCEDURES PERFORMED:  Exploratory laparoscopy, lysis of adhesions, and exploratory laparotomy. SURGEON:  Rosmery García MD    ASSISTANT:  Darling Brown MD    ANESTHESIA:  General endotracheal tube anesthesia. COMPLICATIONS:  None. SPECIMENS REMOVED:  None. ESTIMATED BLOOD LOSS:  Less than 50 mL. PATIENT CONDITION:  Stable. PROCEEDINGS:  This 78-year-old G4, P3-0-1-3 presented to the emergency department with right lower quadrant pain. She was evaluated to include physical examination, labs, CT scan, and pelvic ultrasound. CT scan was suggestive of a cystic lesion near the cecum. Pelvic ultrasound then showed an 8 cm avascular cystic structure in the right adnexa suggestive of right ovarian torsion. OB/GYN was consulted. The patient reported continued right lower quadrant pain. She also reported nausea with one episode of vomiting during the course of the day. She denied vaginal bleeding. Pregnancy test was negative. Results of the CT scan and pelvic ultrasound were reviewed with the patient. Risks, benefits, and alternatives of exploratory laparoscopy with right oophorectomy and possible laparotomy for a diagnosis of right ovarian torsion were reviewed. Risks of bleeding, infection, injury to surrounding structures, and postop complications were reviewed. All questions were answered. Consents were signed. Due to morbid obesity, the high risk of conversion to laparotomy was reviewed. The patient was taken to the operating room with IV running in placed under general anesthesia.   She was then Janneth Melgar prepared and draped in the normal sterile fashion in the dorsal lithotomy position using Rob stirrups. Care was taken, while placing the patient's legs in [address] [place] [address], to avoid any unnecessary nerve compression. A 5-pound weighted speculum and right-angle retractor were used to visualize the cervix and the anterior lip of the cervix was grasped with a single-tooth tenaculum. The cervix was then dilated to allow passage of a Diana uterine manipulator. Schafer catheter was placed to gravity and the case was taken above. The umbilicus was injected using 0.5% lidocaine with epinephrine. A vertical incision was made in the umbilicus. The anterior abdominal wall was elevated and 10mm 0 degree scope was placed under direct visualization. The abdomen was insufflated using CO2 gas to achieve a pressure of 15. The uterus was elevated out of the pelvis and no mass was seen in the pelvis on initial inspection. Left and right trocar sites were injected using local, an incision was made and a 5 mm trocar was placed under direct visualization on the left and an 11mm trocar on the right. The patient was placed in Trendelenburg position. The uterus was elevated. A blunt probe was placed through the left trocar site. Uterus was examined and appeared normal.  The left ovary and tube were examined and appeared normal.  Pictures were taken. No right ovary or tube was visualized. Multiple pictures were taken. Several minutes were spent attempting to locate an 8 cm cystic mass and/or right ovary. No mass was seen. No ovary was found. Upon further inspection, adhesions were noted between the bowel and the right pelvic/abdominal sidewall and there was white tissue noted behind the bowel which appeared consistent with ovarian tissue. Blunt dissection was performed using the probe; however, the adhesions were noted to be dense.  Due to concern for a cystic mass or ovarian torsion, the decision was made to proceed with exploratory laparotomy. The blunt probe was removed from the trocar. CO2 gas was allowed to escape from the abdomen. The trocar sites were removed. An exploratory laparotomy was performed. A Pfannenstiel skin incision was made using the Bovie and carried through to the underlying layer of fascia. The fascia was incised in the midline and the incision was extended laterally using the Bovie. The superior aspect of the fascial incision was grasped with Kocher clamps, elevated, and the underlying rectus muscles were dissected off bluntly and using the Bovie. Attention was then turned to the inferior aspect of the incision which in a similar fashion was grasped with Kocher clamps, elevated, and the underlying rectus muscles were dissected off bluntly and using the Bovie. The rectus muscles were bluntly  in the midline. The peritoneum was identified and entered bluntly. The peritoneal incision was then extended superiorly and inferiorly using Metzenbaum scissors. Due to morbid obesity and limited visualization, the medial aspect of the rectus muscles were transected bilaterally using the Bovie. An O'Donato-O'Carrasco retractor was placed into the incision. Multiple attempts were made to use the O'Donato-O'Carrasco retractor; however, due to the patient's body habitus, despite using a deep O'Donato-O'Carrasco retractor, the retractor would not remain in the incision. The decision was made to try to use a Orlando retractor. The retractor was placed into the incision. Care was taken to make sure bowel was safely removed out of the way of the retractor. The retractor was able to be extended laterally. The bowel was packed away with three moist laparotomy sponges. The pelvis was examined. The left tube and ovary were visualized and noted to be within normal limits. The uterus was visualized and noted to be within normal limits. Again, no right ovary or right tube was seen.   The pelvic and abdominal sidewall were explored. The bowel was noted to be adhesed to the pelvic sidewall; however, no tissue resembling ovarian tissue was seen or palpated. During the course of conversion to an exploratory laparotomy, I asked that Dr. Clelia Gottron be called into the case. He did join the case and also agreed that the right ovary and tube were not visualized in the pelvis and also was unable to find any ovarian tissue along the right pelvic or right abdominal sidewall. The appendix was visualized and appeared normal.  The bowel was allowed to return to its normal anatomic position and the Goldsmith retractor was removed. The muscles were inspected and noted to be hemostatic. The fascia was reapproximated using 0 Vicryl in a running fashion. The subcuticular tissues were reapproximated using running 3-0 plain. Two layers of the 3-0 plain gut were placed due to extensive adipose tissue. Finally, the skin was closed with the Insorb stapler. The skin incisions from the trocar sites were closed using Dermabond. The Diana was removed from the uterus. The Schafer catheter remained to gravity. Sponge, lap, and needle counts were correct x2 and the patient was taken to the recovery room awake and in stable condition.       Wanda Loomis md      [id number]  D:  03/10/2022 3:42  T:  03/10/2022 14:48  JOB #:  [id number]

## 2024-06-18 NOTE — PROGRESS NOTES
Geoffrey Cohn MD                                  [address] [place] [address], [address]                                  [place] [address]                                  Phone [phone], Fax [phone]      History and Physical    Patient: Anali Strauss MRN: [id number]     YOB: 1985  Age: 39 y.o.  Sex: female              PCP: Lydia Love PA   Date:  6/20/2024      Anali Strauss  who presents to the [facility] for consideration of bariatric surgery.  This is her initial consultation preparing her for our multi-disciplinary surgical preparatory regimen.  She presents with a height of 1.549 m (5' 1\") and weight of (!) 144.7 kg (319 lb), giving her a Body mass index is 60.27 kg/m².  She has an ideal body weight of 132 lbs, and excess body weight of 187 lbs.      BARIATRIC PROCEDURE OF INTEREST: laparoscopic sleeve gastrectomy    30-day Bariatric Surgical Risk Percentage: 4.31, 9.16%    MEDICAL HISTORY:  Morbid Obesity  Depression   Anxiety  Hx of laparotomy in 2022     Comorbidity Yes or No   Obstructive Sleep Apnea  CPAP/BIPAP use= No, suspected   Diabetes Mellitus No   Hypertension No   Gastroesophageal Reflux  Treatment Med= Omeprazole Yes   Hyperlipidemia with medication No   Coronary Artery Disease No   Cancer No   Asthma Yes   Osteoarthritis No   Joint Pain No     Admits to occasional GERD symptoms including heartburn and regurgitation with certain foods or beverages, denies dysphagia. Reports taking Omeprazole 20 mg daily which controls her symptoms, no previous EGD or testing.    Other Yes or No   Venous Stasis No   Dialysis No   Long term use of steroids or immunocompromised conditions No   On Anticoagulants No       PRIOR WEIGHT LOSS ATTEMPTS:  Reports 4-10 previous weight loss attempts including medication (Wellbutrin, Phentermine), and self-supervised diets with exercise.    EXERCISE ASSESSMENT:  Reports current physical activity regimen

## 2024-06-20 ENCOUNTER — OFFICE VISIT (OUTPATIENT)
Dept: SURGERY | Age: 39
End: 2024-06-20
Payer: MEDICAID

## 2024-06-20 VITALS
WEIGHT: 293 LBS | HEART RATE: 74 BPM | BODY MASS INDEX: 55.32 KG/M2 | DIASTOLIC BLOOD PRESSURE: 100 MMHG | HEIGHT: 61 IN | SYSTOLIC BLOOD PRESSURE: 152 MMHG

## 2024-06-20 DIAGNOSIS — J45.20 MILD INTERMITTENT ASTHMA WITHOUT COMPLICATION: ICD-10-CM

## 2024-06-20 DIAGNOSIS — E66.01 MORBID OBESITY (HCC): Primary | ICD-10-CM

## 2024-06-20 DIAGNOSIS — E66.01 OBESITY, MORBID, BMI 50 OR HIGHER (HCC): ICD-10-CM

## 2024-06-20 DIAGNOSIS — Z01.812 ENCOUNTER FOR PRE-OPERATIVE LABORATORY TESTING: ICD-10-CM

## 2024-06-20 DIAGNOSIS — Z13.21 ENCOUNTER FOR VITAMIN DEFICIENCY SCREENING: ICD-10-CM

## 2024-06-20 DIAGNOSIS — K21.9 GASTROESOPHAGEAL REFLUX DISEASE, UNSPECIFIED WHETHER ESOPHAGITIS PRESENT: ICD-10-CM

## 2024-06-20 DIAGNOSIS — Z71.89 ENCOUNTER FOR PRE-BARIATRIC SURGERY COUNSELING AND EDUCATION: ICD-10-CM

## 2024-06-20 PROBLEM — F39 MOOD DISORDER (HCC): Status: ACTIVE | Noted: 2018-10-05

## 2024-06-20 PROBLEM — J45.909 ASTHMA: Status: ACTIVE | Noted: 2024-06-20

## 2024-06-20 PROBLEM — E66.09 OBESITY DUE TO EXCESS CALORIES WITHOUT SERIOUS COMORBIDITY: Status: ACTIVE | Noted: 2018-10-05

## 2024-06-20 PROBLEM — F41.9 ANXIETY: Status: ACTIVE | Noted: 2019-06-14

## 2024-06-20 PROBLEM — D50.0 IRON DEFICIENCY ANEMIA DUE TO CHRONIC BLOOD LOSS: Status: ACTIVE | Noted: 2021-12-27

## 2024-06-20 PROBLEM — E55.9 VITAMIN D DEFICIENCY: Status: ACTIVE | Noted: 2020-05-15

## 2024-06-20 PROBLEM — J30.9 ALLERGIC RHINITIS: Status: ACTIVE | Noted: 2018-10-05

## 2024-06-20 PROCEDURE — 99204 OFFICE O/P NEW MOD 45 MIN: CPT | Performed by: SURGERY

## 2024-06-20 PROCEDURE — APPSS45 APP SPLIT SHARED TIME 31-45 MINUTES: Performed by: PHYSICIAN ASSISTANT

## 2024-06-20 RX ORDER — ERGOCALCIFEROL 1.25 MG/1
50000 CAPSULE ORAL WEEKLY
COMMUNITY
Start: 2024-03-31

## 2024-06-20 RX ORDER — BUPROPION HYDROCHLORIDE 300 MG/1
300 TABLET ORAL DAILY
COMMUNITY
Start: 2023-10-03 | End: 2024-10-02

## 2024-06-20 RX ORDER — FERROUS SULFATE 325(65) MG
TABLET ORAL
COMMUNITY
Start: 2024-03-31

## 2024-06-27 ENCOUNTER — HOSPITAL ENCOUNTER (OUTPATIENT)
Dept: PHYSICAL THERAPY | Age: 39
Setting detail: RECURRING SERIES
Discharge: HOME | End: 2024-06-30
Attending: SURGERY
Payer: MEDICAID

## 2024-06-27 PROCEDURE — 97161 PT EVAL LOW COMPLEX 20 MIN: CPT

## 2024-06-27 ASSESSMENT — PAIN SCALES - GENERAL: PAINLEVEL_OUTOF10: 0

## 2024-06-27 NOTE — PROGRESS NOTES
Spouse; Family  Type of Home: House  Home Layout: One level  Home Access: Stairs to enter without rails  Entrance Stairs - Number of Steps: 1     Prior Level of Function/Work/Activity:   Prior level of function: Independent  Current level of function: Independent       Learning:   Does the patient/guardian have any barriers to learning?: No barriers  Will there be a co-learner?: No  What is the preferred language of the patient/guardian?: English  Is an  required?: No  How does the patient/guardian prefer to learn new concepts?: Demonstration     Fall Risk Scale:   Ballard Total Score: 0  Ballard Fall Risk: Low (0-24)      OBJECTIVE   Lumbar AROM: WNL    Gross UE: WNL    Gross LE: WNL    ASSESSMENT   Initial Assessment:This patient attended the PT portion of the surgical weight loss program and received education regarding a HEP focused on generalized strength and conditioning in order to optimize surgical outcomes. This patient was instructed to contact the clinic with any future questions and will be discharged from PT at this time.    Problem List: (Impacting functional limitations):    Body Structures, Functions, Activity Limitations Requiring Skilled Therapeutic Intervention: Decreased functional mobility ; Decreased ROM; Decreased strength; Decreased endurance; Increased pain     Therapy Prognosis:   Therapy Prognosis: Good     Initial Assessment Complexity:   Decision Making: Low Complexity    Tool Used: Lower Extremity Functional Scale (LEFS)  Score:  Initial: 39/80 (Date: 6/27/24)   Interpretation of Score: 20 questions each scored on a 5 point scale with 0 representing \"extreme difficulty or unable to perform\" and 4 representing \"no difficulty\". The lower the score, the greater the functional disability. 80/80 represents no disability. Minimal detectable change is 9 points.    PLAN   Effective Date(s): Plan of Care/Certification Expiration Date: 06/27/24       Frequency/Duration: One visit per week

## 2024-07-02 ENCOUNTER — OFFICE VISIT (OUTPATIENT)
Dept: BEHAVIORAL/MENTAL HEALTH CLINIC | Facility: CLINIC | Age: 39
End: 2024-07-02
Payer: MEDICAID

## 2024-07-02 DIAGNOSIS — Z13.21 ENCOUNTER FOR VITAMIN DEFICIENCY SCREENING: ICD-10-CM

## 2024-07-02 DIAGNOSIS — Z01.812 ENCOUNTER FOR PRE-OPERATIVE LABORATORY TESTING: ICD-10-CM

## 2024-07-02 DIAGNOSIS — F41.1 GAD (GENERALIZED ANXIETY DISORDER): ICD-10-CM

## 2024-07-02 DIAGNOSIS — Z86.59 HISTORY OF PANIC ATTACKS: ICD-10-CM

## 2024-07-02 DIAGNOSIS — E66.01 MORBID OBESITY (HCC): ICD-10-CM

## 2024-07-02 DIAGNOSIS — F33.42 MDD (MAJOR DEPRESSIVE DISORDER), RECURRENT, IN FULL REMISSION (HCC): ICD-10-CM

## 2024-07-02 DIAGNOSIS — E66.01 MORBID OBESITY WITH BMI OF 60.0-69.9, ADULT (HCC): Primary | ICD-10-CM

## 2024-07-02 DIAGNOSIS — Z62.819 HISTORY OF ABUSE IN CHILDHOOD: ICD-10-CM

## 2024-07-02 DIAGNOSIS — Z71.89 ENCOUNTER FOR PRE-BARIATRIC SURGERY COUNSELING AND EDUCATION: ICD-10-CM

## 2024-07-02 LAB
25(OH)D3 SERPL-MCNC: 33 NG/ML (ref 30–100)
CHOLEST SERPL-MCNC: 170 MG/DL (ref 0–200)
EST. AVERAGE GLUCOSE BLD GHB EST-MCNC: 110 MG/DL
FERRITIN SERPL-MCNC: 63 NG/ML (ref 8–388)
FOLATE SERPL-MCNC: 8.1 NG/ML (ref 3.1–17.5)
HBA1C MFR BLD: 5.5 % (ref 0–5.6)
HDLC SERPL-MCNC: 35 MG/DL (ref 40–60)
HDLC SERPL: 4.9 (ref 0–5)
IRON SERPL-MCNC: 62 UG/DL (ref 35–100)
LDLC SERPL CALC-MCNC: 109 MG/DL (ref 0–100)
TRIGL SERPL-MCNC: 130 MG/DL (ref 0–150)
TSH, 3RD GENERATION: 2.08 UIU/ML (ref 0.27–4.2)
VIT B12 SERPL-MCNC: 392 PG/ML (ref 193–986)
VLDLC SERPL CALC-MCNC: 26 MG/DL (ref 6–23)

## 2024-07-02 PROCEDURE — 90791 PSYCH DIAGNOSTIC EVALUATION: CPT | Performed by: SOCIAL WORKER

## 2024-07-02 ASSESSMENT — PATIENT HEALTH QUESTIONNAIRE - PHQ9
7. TROUBLE CONCENTRATING ON THINGS, SUCH AS READING THE NEWSPAPER OR WATCHING TELEVISION: NOT AT ALL
SUM OF ALL RESPONSES TO PHQ QUESTIONS 1-9: 1
3. TROUBLE FALLING OR STAYING ASLEEP: NOT AT ALL
SUM OF ALL RESPONSES TO PHQ QUESTIONS 1-9: 1
1. LITTLE INTEREST OR PLEASURE IN DOING THINGS: NOT AT ALL
SUM OF ALL RESPONSES TO PHQ QUESTIONS 1-9: 1
5. POOR APPETITE OR OVEREATING: NOT AT ALL
2. FEELING DOWN, DEPRESSED OR HOPELESS: NOT AT ALL
8. MOVING OR SPEAKING SO SLOWLY THAT OTHER PEOPLE COULD HAVE NOTICED. OR THE OPPOSITE, BEING SO FIGETY OR RESTLESS THAT YOU HAVE BEEN MOVING AROUND A LOT MORE THAN USUAL: NOT AT ALL
9. THOUGHTS THAT YOU WOULD BE BETTER OFF DEAD, OR OF HURTING YOURSELF: NOT AT ALL
SUM OF ALL RESPONSES TO PHQ9 QUESTIONS 1 & 2: 0
SUM OF ALL RESPONSES TO PHQ QUESTIONS 1-9: 1
6. FEELING BAD ABOUT YOURSELF - OR THAT YOU ARE A FAILURE OR HAVE LET YOURSELF OR YOUR FAMILY DOWN: NOT AT ALL
4. FEELING TIRED OR HAVING LITTLE ENERGY: SEVERAL DAYS

## 2024-07-02 ASSESSMENT — LIFESTYLE VARIABLES
HOW MANY STANDARD DRINKS CONTAINING ALCOHOL DO YOU HAVE ON A TYPICAL DAY: PATIENT DOES NOT DRINK
HOW OFTEN DO YOU HAVE A DRINK CONTAINING ALCOHOL: NEVER

## 2024-07-02 ASSESSMENT — ANXIETY QUESTIONNAIRES
5. BEING SO RESTLESS THAT IT IS HARD TO SIT STILL: NOT AT ALL
GAD7 TOTAL SCORE: 3
6. BECOMING EASILY ANNOYED OR IRRITABLE: SEVERAL DAYS
1. FEELING NERVOUS, ANXIOUS, OR ON EDGE: SEVERAL DAYS
4. TROUBLE RELAXING: NOT AT ALL
7. FEELING AFRAID AS IF SOMETHING AWFUL MIGHT HAPPEN: NOT AT ALL
3. WORRYING TOO MUCH ABOUT DIFFERENT THINGS: SEVERAL DAYS
2. NOT BEING ABLE TO STOP OR CONTROL WORRYING: NOT AT ALL

## 2024-07-02 NOTE — PROGRESS NOTES
week   []Sporadic Pattern    Duration: []10-20 mins []30 mins []45 mins [x]60 mins or more    Past:   []None  [x]Walking   [x]Cardio  [x]Strength Training   [x]Fitness Classes []  []Sports  [x]Yoga/Pilates    []DVDs or Videos []Water Exercises/Swimming   []Other    Frequency:  []1-2 x a week []2-3 x a week []3-4 x a week [x]4-5 x a week   []More than 5 x a week   []Sporadic Pattern    Duration: []10-20 mins []30 mins []45 mins [x]60 mins or more    Sleep Pattern:  Goes to Bed: 8 pm  Rises: 6 am  Time to Fall Asleep:45 mins  Stays Asleep?    [x]Yes  []No   []Sometimes  Feels Rested During the Day: [x]Yes  []No   []Sometimes    Sleep Disorder:   [x]None []Sleep Apnea []Insomnia          []Uses C-Pap/Bipap          []Does NOT tolerate/use C-Pap          []Sleep study results pending   []Pt suspects          []Dental Device   Medical History:    has a past medical history of Anxiety, Depression, and GERD (gastroesophageal reflux disease).    Current Medications:    Current Outpatient Medications:     buPROPion (WELLBUTRIN XL) 300 MG extended release tablet, Take 1 tablet by mouth daily, Disp: , Rfl:     vitamin D (ERGOCALCIFEROL) 1.25 MG (22694 UT) CAPS capsule, Take 1 capsule by mouth Once a week at 5 PM, Disp: , Rfl:     ferrous sulfate (IRON 325) 325 (65 Fe) MG tablet, TAKE 1 TABLET (325 MG) BY MOUTH 2 (TWO) TIMES A DAY, Disp: , Rfl:     albuterol sulfate  (90 Base) MCG/ACT inhaler, Inhale 2 puffs into the lungs every 4 hours as needed, Disp: , Rfl:     Cholecalciferol 50 MCG (2000 UT) CAPS, Take 2,000 Units by mouth daily, Disp: , Rfl:     clonazePAM (KLONOPIN) 1 MG tablet, Take by mouth 2 times daily., Disp: , Rfl:     FLUoxetine (PROZAC) 20 MG capsule, Take 60 mg by mouth daily, Disp: , Rfl:     ondansetron (ZOFRAN-ODT) 8 MG TBDP disintegrating tablet, Take 8 mg by mouth every 8 hours as needed, Disp: , Rfl:     Objective Assessment:   [x]Pt is ambulatory, drives and is independent with

## 2024-07-08 ENCOUNTER — OFFICE VISIT (OUTPATIENT)
Dept: SURGERY | Age: 39
End: 2024-07-08
Payer: MEDICARE

## 2024-07-08 VITALS
DIASTOLIC BLOOD PRESSURE: 94 MMHG | SYSTOLIC BLOOD PRESSURE: 157 MMHG | BODY MASS INDEX: 55.32 KG/M2 | HEIGHT: 61 IN | WEIGHT: 293 LBS | HEART RATE: 104 BPM

## 2024-07-08 DIAGNOSIS — E78.2 MIXED HYPERLIPIDEMIA: ICD-10-CM

## 2024-07-08 DIAGNOSIS — E66.01 OBESITY, MORBID, BMI 50 OR HIGHER (HCC): ICD-10-CM

## 2024-07-08 DIAGNOSIS — Z71.82 EXERCISE COUNSELING: ICD-10-CM

## 2024-07-08 DIAGNOSIS — K21.9 GASTROESOPHAGEAL REFLUX DISEASE, UNSPECIFIED WHETHER ESOPHAGITIS PRESENT: ICD-10-CM

## 2024-07-08 DIAGNOSIS — E66.01 MORBID OBESITY (HCC): Primary | ICD-10-CM

## 2024-07-08 DIAGNOSIS — Z71.3 DIETARY COUNSELING: ICD-10-CM

## 2024-07-08 DIAGNOSIS — J45.20 MILD INTERMITTENT ASTHMA WITHOUT COMPLICATION: ICD-10-CM

## 2024-07-08 PROBLEM — I10 PRIMARY HYPERTENSION: Status: ACTIVE | Noted: 2024-06-25

## 2024-07-08 PROCEDURE — 99215 OFFICE O/P EST HI 40 MIN: CPT | Performed by: PHYSICIAN ASSISTANT

## 2024-07-08 PROCEDURE — 3077F SYST BP >= 140 MM HG: CPT | Performed by: PHYSICIAN ASSISTANT

## 2024-07-08 PROCEDURE — 3080F DIAST BP >= 90 MM HG: CPT | Performed by: PHYSICIAN ASSISTANT

## 2024-07-08 RX ORDER — AMLODIPINE BESYLATE 5 MG/1
5 TABLET ORAL DAILY
COMMUNITY
Start: 2024-06-25 | End: 2024-09-23

## 2024-07-08 RX ORDER — OMEPRAZOLE 10 MG/1
20 CAPSULE, DELAYED RELEASE ORAL DAILY
COMMUNITY

## 2024-07-08 NOTE — PROGRESS NOTES
L INITIAL ASSESSMENT    Nutrition Assessment:  Anthropometrics:    Ht: 5’1”, wt: 319#, 247% IBW, 60.27 BMI    Macronutrient needs assessment   EER: 3194-6750 kcal/d (14-16 kcal/kg ABW)    MSJ x 1.3-500 = 2170 kcal/d (sedentary AF)   EPR: 60-90g pro (1.0-1.5 gm pro/kg IBW)   CHO: ~271 g CHO/d (50% EER, ~6 servings CHO/meal)   H2O: 1mL/kcal or per MD recommendations     Support:  Pt has told , children, friends - to be good support.  Reminded pt about [facility] support group and encouraged attendance.      Motivation:  High. Reports 4-10 previous weight loss attempts including medication (Wellbutrin, Phentermine), and self-supervised diets with exercise.     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    Exercise Assessment:   PAR-Q: No contraindications to exercise  Medical:     Pain or injuries (present): None     Past surgeries related to mobility: None   Exercise equipment at home: None    Gym Membership: Yes - crunch   Current Exercise Routine: Elliptical and treadmill for 30min 2-3 x weekly   Assessment Exercise Goal: Continuing current exercise and increase as able     Nutrition Diagnosis:  Morbid obesity R/T excessive energy intake and yoyo dieting as evidenced by BMI = 60.27 and 247 % IBW.      Nutrition Intervention:   Diet Rx - Low-moderate calorie intake (~1900 kcal/day).  Work on eating 3 meals/d and meal 
more than 50% of visit: 45 minutes: I spent this time preparing to see patient (including chart review and preparation), obtaining and/or reviewing additional medical history, performing a physical exam and evaluation, documenting clinical information in the electronic health record, independently interpreting results, communicating results to patient, family or caregiver, and/or coordinating care.

## 2024-07-12 LAB
COTININE SERPL-MCNC: <1 NG/ML
NICOTINE SERPL-MCNC: <1 NG/ML

## 2024-07-22 ENCOUNTER — HOSPITAL ENCOUNTER (OUTPATIENT)
Dept: GENERAL RADIOLOGY | Age: 39
Discharge: HOME | End: 2024-07-25
Attending: SURGERY
Payer: MEDICAID

## 2024-07-22 DIAGNOSIS — Z71.89 ENCOUNTER FOR PRE-BARIATRIC SURGERY COUNSELING AND EDUCATION: ICD-10-CM

## 2024-07-22 DIAGNOSIS — Z01.812 ENCOUNTER FOR PRE-OPERATIVE LABORATORY TESTING: ICD-10-CM

## 2024-07-22 DIAGNOSIS — E66.01 MORBID OBESITY (HCC): ICD-10-CM

## 2024-07-22 DIAGNOSIS — K21.9 GASTROESOPHAGEAL REFLUX DISEASE, UNSPECIFIED WHETHER ESOPHAGITIS PRESENT: ICD-10-CM

## 2024-07-22 PROCEDURE — 2500000003 HC RX 250 WO HCPCS: Performed by: PHYSICIAN ASSISTANT

## 2024-07-22 PROCEDURE — 74240 X-RAY XM UPR GI TRC 1CNTRST: CPT

## 2024-07-22 PROCEDURE — 6370000000 HC RX 637 (ALT 250 FOR IP): Performed by: PHYSICIAN ASSISTANT

## 2024-07-22 RX ADMIN — BARIUM SULFATE 140 ML: 980 POWDER, FOR SUSPENSION ORAL at 09:16

## 2024-07-22 RX ADMIN — ANTACID/ANTIFLATULENT 1 EACH: 380; 550; 10; 10 GRANULE, EFFERVESCENT ORAL at 09:16

## 2024-08-01 NOTE — PROGRESS NOTES
MARYURI  8/5/2024    Time spent: 30 minutes was spent preparing to see patient (including chart review and preparation), obtaining and/or reviewing additional medical history, performing a physical exam and evaluation, documenting clinical information in the EHR, independently interpreting results, communicating results to patient, family or caregiver, and/or coordinating care.

## 2024-08-05 ENCOUNTER — OFFICE VISIT (OUTPATIENT)
Dept: SURGERY | Age: 39
End: 2024-08-05
Payer: MEDICAID

## 2024-08-05 VITALS
BODY MASS INDEX: 55.32 KG/M2 | DIASTOLIC BLOOD PRESSURE: 73 MMHG | HEIGHT: 61 IN | SYSTOLIC BLOOD PRESSURE: 162 MMHG | WEIGHT: 293 LBS | HEART RATE: 89 BPM

## 2024-08-05 DIAGNOSIS — J45.20 MILD INTERMITTENT ASTHMA WITHOUT COMPLICATION: ICD-10-CM

## 2024-08-05 DIAGNOSIS — Z71.82 EXERCISE COUNSELING: ICD-10-CM

## 2024-08-05 DIAGNOSIS — E78.2 MIXED HYPERLIPIDEMIA: ICD-10-CM

## 2024-08-05 DIAGNOSIS — K21.9 GASTROESOPHAGEAL REFLUX DISEASE, UNSPECIFIED WHETHER ESOPHAGITIS PRESENT: ICD-10-CM

## 2024-08-05 DIAGNOSIS — Z71.3 DIETARY COUNSELING: ICD-10-CM

## 2024-08-05 DIAGNOSIS — E66.01 MORBID OBESITY (HCC): Primary | ICD-10-CM

## 2024-08-05 DIAGNOSIS — E66.01 OBESITY, MORBID, BMI 50 OR HIGHER (HCC): ICD-10-CM

## 2024-08-05 PROCEDURE — 3078F DIAST BP <80 MM HG: CPT | Performed by: PHYSICIAN ASSISTANT

## 2024-08-05 PROCEDURE — 3077F SYST BP >= 140 MM HG: CPT | Performed by: PHYSICIAN ASSISTANT

## 2024-08-05 PROCEDURE — 99214 OFFICE O/P EST MOD 30 MIN: CPT | Performed by: PHYSICIAN ASSISTANT

## 2024-08-13 ENCOUNTER — PREP FOR PROCEDURE (OUTPATIENT)
Dept: SURGERY | Age: 39
End: 2024-08-13

## 2024-08-13 ENCOUNTER — OFFICE VISIT (OUTPATIENT)
Dept: SURGERY | Age: 39
End: 2024-08-13
Payer: MEDICAID

## 2024-08-13 VITALS
DIASTOLIC BLOOD PRESSURE: 87 MMHG | WEIGHT: 293 LBS | HEIGHT: 61 IN | BODY MASS INDEX: 55.32 KG/M2 | SYSTOLIC BLOOD PRESSURE: 155 MMHG | HEART RATE: 99 BPM

## 2024-08-13 DIAGNOSIS — E66.01 MORBID OBESITY: ICD-10-CM

## 2024-08-13 DIAGNOSIS — E78.2 MIXED HYPERLIPIDEMIA: ICD-10-CM

## 2024-08-13 DIAGNOSIS — E66.01 OBESITY, MORBID, BMI 50 OR HIGHER (HCC): ICD-10-CM

## 2024-08-13 DIAGNOSIS — K21.9 GASTROESOPHAGEAL REFLUX DISEASE, UNSPECIFIED WHETHER ESOPHAGITIS PRESENT: ICD-10-CM

## 2024-08-13 DIAGNOSIS — R11.2 POST-OPERATIVE NAUSEA AND VOMITING: ICD-10-CM

## 2024-08-13 DIAGNOSIS — E66.01 MORBID OBESITY (HCC): Primary | ICD-10-CM

## 2024-08-13 DIAGNOSIS — G89.18 POST-OPERATIVE PAIN: ICD-10-CM

## 2024-08-13 DIAGNOSIS — J45.20 MILD INTERMITTENT ASTHMA WITHOUT COMPLICATION: ICD-10-CM

## 2024-08-13 DIAGNOSIS — Z98.890 POST-OPERATIVE NAUSEA AND VOMITING: ICD-10-CM

## 2024-08-13 PROCEDURE — 99213 OFFICE O/P EST LOW 20 MIN: CPT | Performed by: SURGERY

## 2024-08-13 PROCEDURE — 3079F DIAST BP 80-89 MM HG: CPT | Performed by: SURGERY

## 2024-08-13 PROCEDURE — 3077F SYST BP >= 140 MM HG: CPT | Performed by: SURGERY

## 2024-08-13 PROCEDURE — APPSS45 APP SPLIT SHARED TIME 31-45 MINUTES: Performed by: PHYSICIAN ASSISTANT

## 2024-08-13 RX ORDER — OXYCODONE HYDROCHLORIDE AND ACETAMINOPHEN 5; 325 MG/1; MG/1
1 TABLET ORAL EVERY 6 HOURS PRN
Qty: 5 TABLET | Refills: 0 | Status: SHIPPED | OUTPATIENT
Start: 2024-08-13 | End: 2024-08-16

## 2024-08-13 RX ORDER — ONDANSETRON 4 MG/1
4 TABLET, FILM COATED ORAL 3 TIMES DAILY PRN
Qty: 30 TABLET | Refills: 1 | Status: SHIPPED | OUTPATIENT
Start: 2024-08-13

## 2024-08-13 RX ORDER — OMEPRAZOLE 40 MG/1
40 CAPSULE, DELAYED RELEASE ORAL
Qty: 90 CAPSULE | Refills: 0 | Status: SHIPPED | OUTPATIENT
Start: 2024-08-13

## 2024-08-21 ENCOUNTER — HOSPITAL ENCOUNTER (OUTPATIENT)
Dept: SURGERY | Age: 39
Discharge: HOME | End: 2024-08-24
Payer: MEDICAID

## 2024-08-21 VITALS
OXYGEN SATURATION: 95 % | SYSTOLIC BLOOD PRESSURE: 135 MMHG | TEMPERATURE: 98.4 F | WEIGHT: 293 LBS | BODY MASS INDEX: 55.32 KG/M2 | DIASTOLIC BLOOD PRESSURE: 85 MMHG | HEIGHT: 61 IN | HEART RATE: 72 BPM

## 2024-08-21 LAB — HGB BLD-MCNC: 12.9 G/DL (ref 11.7–15.4)

## 2024-08-21 PROCEDURE — 85018 HEMOGLOBIN: CPT

## 2024-08-21 NOTE — PERIOP NOTE
SURGICAL WEIGHT LOSS Enhanced Recovery After Surgery: diabetic and non-diabetic patients      The evening before surgery 9/3/24, drink 1 bottles of the Ensure Pre-Surgery drink.     Please do not eat or drink after midnight the night before your surgery.     Bring your patient handbook with you to the hospital.        Things to Remember:      1. You will be up in a chair the evening of surgery and sipping on clear liquids, once released by your surgeon.       2. Beginning the day of surgery, you will be out of the bed as able, once released by your hospital team. We encourage you to be sitting up in a chair, walking in the alarcon, doing exercises as advised by physical therapy, etc.       3. You will be given scheduled non-narcotic pain medication to help keep your pain under control.  You will have stronger pain medication ordered for break through pain.      4. All of these measures are geared toward improving your overall surgical recovery and   decreasing the risk of complications.    
  PLEASE CONTINUE TAKING ALL PRESCRIPTION MEDICATIONS UP TO THE DAY OF SURGERY UNLESS OTHERWISE DIRECTED BELOW. You may take Tylenol, allergy,  and/or indigestion medications.     TAKE ONLY THESE MEDICATIONS ON THE DAY OF SURGERY    Amlodipine, Ferrous sulfate, Fluoxetine, Omeprazole,    Inhaler if needed        DISCONTINUE all vitamins and supplements 7 days prior to surgery. DISCONTINUE Non-Steroidal Anti-Inflammatory (NSAIDS) such as Advil and Aleve 5 days prior to surgery.     Home Medications to Hold- please continue all other medications except these.    HOLD all NSAIDS for 5 days before surgery: Ibuprofen        Comments   Bring to hospital: inhaler, incentive spirometer, Omeprazole   On the day before surgery please take 2 Tylenol in the morning and then again before bed. You may use either regular or extra strength.           Please do not bring home medications with you on the day of surgery unless otherwise directed by your nurse.  If you are instructed to bring home medications, please give them to your nurse as they will be administered by the nursing staff.    If you have any questions, please call [facility] [phone].    A copy of this note was provided to the patient for reference.     
  Patient verified name and     Order for consent IS NOT found in EHR; patient verified.     Type 2 surgery, walk in assessment complete.    Labs per surgeon: none in EHR at time of assessment.  Labs per anesthesia protocol: Hgb; results processing.  Hcg needed DOS.  EKG: not needed per anesthesia protocols.        Patient provided with and instructed on educational handouts including Guide to Surgery, Preventing Surgical Site Infections, Pain Management, and [place] [facility] Brochure.    Patient answered medical/surgical history questions at their best of ability. All prior to admission medications documented in EPIC. Original medication prescription bottles were not visualized during patient appointment.     Patient instructed to hold all vitamins 7 days prior to surgery and NSAIDS 5 days prior to surgery, patient verbalized understanding.     Patient teach back successful and patient demonstrates knowledge of instructions.     
Lab results within anesthesia guidelines, no follow-up required. Labs automatically routed to ordering provider via Epic documentation.       Latest Reference Range & Units 08/21/24 09:14   Hemoglobin Quant 11.7 - 15.4 g/dL 12.9     
Render In Strict Bullet Format?: No
Detail Level: Zone
Plan: 5-FU to forehead and temples in 2023

## 2024-08-28 NOTE — H&P
Geoffrey Cohn MD                                  [address] [place] [address], [address] [place] [address]                                  Phone [phone], Fax [phone]    History and Physical    Patient: Anali Strauss MRN: [id number]  SSN: [id number]    YOB: 1985  Age: 39 y.o.  Sex: female              PCP: Lydia Love PA   Date:  8/28/2024    Consult Date: 06/20/2024    Bariatric Procedure of Interest: laparoscopic sleeve gastrectomy    Anali Strauss returns to the [facility] after successful completion of our multidisciplinary surgical prep program.  This is her final consultation preparing her for sleeve gastrectomy surgery.  She presents with a height of 1.549 m (5' 1\") and weight of (!) 142.4 kg (314 lb), giving her a There is no height or weight on file to calculate BMI.   She has an ideal body weight of 132 lbs, and excess body weight of 182 lbs.   She started our program with a weight of 319 lbs, losing 5 lbs.    30-day Bariatric Surgical Risk Percentage: 4.31%    She has completed all aspects of our prep program and has been deemed an acceptable candidate for bariatric surgery.    PSYCHOLOGICAL EVALUATION:   Completed, 07/02/2024 with Jyoti Sher deeming her an appropriate surgical candidate.    DIETITIAN EVALUATION:  Completed with Beverly Chowdhury RD, LD deeming her an appropriate surgical candidate.    BARIATRIC LABS:  Completed, 07/02/2024 (HDL 35, )    UPPER GI:  Completed, 07/18/2024  IMPRESSION:  Unremarkable upper GI    PHYSICAL THERAPY EVALUATION:  Completed, 06/27/2024    We have reviewed the procedure again today and completed our standard pre op teaching. Her questions were answered and she is ready to schedule surgery.    We have discussed the procedure, diet and exercise regimens, and potential complications of surgery.  The patient understands the nature and potential complication of surgery and has

## 2024-09-03 ENCOUNTER — ANESTHESIA EVENT (OUTPATIENT)
Dept: SURGERY | Age: 39
End: 2024-09-03
Payer: MEDICAID

## 2024-09-04 ENCOUNTER — HOSPITAL ENCOUNTER (OUTPATIENT)
Age: 39
Setting detail: OBSERVATION
Discharge: HOME | End: 2024-09-06
Attending: SURGERY | Admitting: SURGERY
Payer: MEDICAID

## 2024-09-04 ENCOUNTER — ANESTHESIA (OUTPATIENT)
Dept: SURGERY | Age: 39
End: 2024-09-04
Payer: MEDICAID

## 2024-09-04 DIAGNOSIS — E66.01 MORBID OBESITY (HCC): ICD-10-CM

## 2024-09-04 LAB
ABO + RH BLD: NORMAL
BLOOD GROUP ANTIBODIES SERPL: NORMAL
CREAT SERPL-MCNC: 0.7 MG/DL (ref 0.6–1.1)
GLUCOSE BLD STRIP.AUTO-MCNC: 136 MG/DL (ref 65–100)
HCG UR QL: NEGATIVE
PLATELET # BLD AUTO: 382 K/UL (ref 150–450)
SERVICE CMNT-IMP: ABNORMAL
SPECIMEN EXP DATE BLD: NORMAL

## 2024-09-04 PROCEDURE — 6360000002 HC RX W HCPCS: Performed by: ANESTHESIOLOGY

## 2024-09-04 PROCEDURE — 85049 AUTOMATED PLATELET COUNT: CPT

## 2024-09-04 PROCEDURE — 2580000003 HC RX 258: Performed by: PHYSICIAN ASSISTANT

## 2024-09-04 PROCEDURE — 82565 ASSAY OF CREATININE: CPT

## 2024-09-04 PROCEDURE — 36415 COLL VENOUS BLD VENIPUNCTURE: CPT

## 2024-09-04 PROCEDURE — 2709999900 HC NON-CHARGEABLE SUPPLY: Performed by: SURGERY

## 2024-09-04 PROCEDURE — 86901 BLOOD TYPING SEROLOGIC RH(D): CPT

## 2024-09-04 PROCEDURE — 2720000010 HC SURG SUPPLY STERILE: Performed by: SURGERY

## 2024-09-04 PROCEDURE — 88312 SPECIAL STAINS GROUP 1: CPT

## 2024-09-04 PROCEDURE — 81025 URINE PREGNANCY TEST: CPT

## 2024-09-04 PROCEDURE — 6360000002 HC RX W HCPCS: Performed by: NURSE ANESTHETIST, CERTIFIED REGISTERED

## 2024-09-04 PROCEDURE — G0378 HOSPITAL OBSERVATION PER HR: HCPCS

## 2024-09-04 PROCEDURE — 7100000000 HC PACU RECOVERY - FIRST 15 MIN: Performed by: SURGERY

## 2024-09-04 PROCEDURE — 86850 RBC ANTIBODY SCREEN: CPT

## 2024-09-04 PROCEDURE — 3700000001 HC ADD 15 MINUTES (ANESTHESIA): Performed by: SURGERY

## 2024-09-04 PROCEDURE — 2500000003 HC RX 250 WO HCPCS: Performed by: PHYSICIAN ASSISTANT

## 2024-09-04 PROCEDURE — 86900 BLOOD TYPING SEROLOGIC ABO: CPT

## 2024-09-04 PROCEDURE — 3600000015 HC SURGERY LEVEL 5 ADDTL 15MIN: Performed by: SURGERY

## 2024-09-04 PROCEDURE — 82962 GLUCOSE BLOOD TEST: CPT

## 2024-09-04 PROCEDURE — 3600000005 HC SURGERY LEVEL 5 BASE: Performed by: SURGERY

## 2024-09-04 PROCEDURE — 6370000000 HC RX 637 (ALT 250 FOR IP): Performed by: PHYSICIAN ASSISTANT

## 2024-09-04 PROCEDURE — 6360000002 HC RX W HCPCS: Performed by: SURGERY

## 2024-09-04 PROCEDURE — C1713 ANCHOR/SCREW BN/BN,TIS/BN: HCPCS | Performed by: SURGERY

## 2024-09-04 PROCEDURE — 7100000001 HC PACU RECOVERY - ADDTL 15 MIN: Performed by: SURGERY

## 2024-09-04 PROCEDURE — 88307 TISSUE EXAM BY PATHOLOGIST: CPT

## 2024-09-04 PROCEDURE — 96372 THER/PROPH/DIAG INJ SC/IM: CPT

## 2024-09-04 PROCEDURE — 6370000000 HC RX 637 (ALT 250 FOR IP): Performed by: ANESTHESIOLOGY

## 2024-09-04 PROCEDURE — 3700000000 HC ANESTHESIA ATTENDED CARE: Performed by: SURGERY

## 2024-09-04 PROCEDURE — 6360000002 HC RX W HCPCS: Performed by: PHYSICIAN ASSISTANT

## 2024-09-04 PROCEDURE — 2500000003 HC RX 250 WO HCPCS: Performed by: ANESTHESIOLOGY

## 2024-09-04 PROCEDURE — 2500000003 HC RX 250 WO HCPCS: Performed by: NURSE ANESTHETIST, CERTIFIED REGISTERED

## 2024-09-04 RX ORDER — PROCHLORPERAZINE EDISYLATE 5 MG/ML
5 INJECTION INTRAMUSCULAR; INTRAVENOUS
Status: DISCONTINUED | OUTPATIENT
Start: 2024-09-04 | End: 2024-09-04 | Stop reason: HOSPADM

## 2024-09-04 RX ORDER — SODIUM CHLORIDE 9 MG/ML
INJECTION, SOLUTION INTRAVENOUS PRN
Status: DISCONTINUED | OUTPATIENT
Start: 2024-09-04 | End: 2024-09-04

## 2024-09-04 RX ORDER — ONDANSETRON 2 MG/ML
4 INJECTION INTRAMUSCULAR; INTRAVENOUS ONCE
Status: DISCONTINUED | OUTPATIENT
Start: 2024-09-04 | End: 2024-09-04 | Stop reason: HOSPADM

## 2024-09-04 RX ORDER — KETAMINE HCL IN NACL, ISO-OSM 20 MG/2 ML
SYRINGE (ML) INJECTION PRN
Status: DISCONTINUED | OUTPATIENT
Start: 2024-09-04 | End: 2024-09-04 | Stop reason: SDUPTHER

## 2024-09-04 RX ORDER — SUCRALFATE 1 G/1
1 TABLET ORAL EVERY 6 HOURS SCHEDULED
Status: DISCONTINUED | OUTPATIENT
Start: 2024-09-04 | End: 2024-09-06 | Stop reason: HOSPADM

## 2024-09-04 RX ORDER — KETOROLAC TROMETHAMINE 30 MG/ML
30 INJECTION, SOLUTION INTRAMUSCULAR; INTRAVENOUS EVERY 6 HOURS
Status: COMPLETED | OUTPATIENT
Start: 2024-09-04 | End: 2024-09-05

## 2024-09-04 RX ORDER — KETOROLAC TROMETHAMINE 30 MG/ML
INJECTION, SOLUTION INTRAMUSCULAR; INTRAVENOUS PRN
Status: DISCONTINUED | OUTPATIENT
Start: 2024-09-04 | End: 2024-09-04 | Stop reason: SDUPTHER

## 2024-09-04 RX ORDER — DIPHENHYDRAMINE HYDROCHLORIDE 50 MG/ML
25 INJECTION INTRAMUSCULAR; INTRAVENOUS EVERY 6 HOURS PRN
Status: DISCONTINUED | OUTPATIENT
Start: 2024-09-04 | End: 2024-09-06 | Stop reason: HOSPADM

## 2024-09-04 RX ORDER — SCOLOPAMINE TRANSDERMAL SYSTEM 1 MG/1
1 PATCH, EXTENDED RELEASE TRANSDERMAL
Status: DISCONTINUED | OUTPATIENT
Start: 2024-09-04 | End: 2024-09-04

## 2024-09-04 RX ORDER — MIDAZOLAM HYDROCHLORIDE 2 MG/2ML
2 INJECTION, SOLUTION INTRAMUSCULAR; INTRAVENOUS
Status: DISCONTINUED | OUTPATIENT
Start: 2024-09-04 | End: 2024-09-04 | Stop reason: HOSPADM

## 2024-09-04 RX ORDER — ACETAMINOPHEN 325 MG/1
650 TABLET ORAL EVERY 6 HOURS
Status: DISCONTINUED | OUTPATIENT
Start: 2024-09-04 | End: 2024-09-06 | Stop reason: HOSPADM

## 2024-09-04 RX ORDER — SIMETHICONE 80 MG
80 TABLET,CHEWABLE ORAL EVERY 6 HOURS PRN
Status: DISCONTINUED | OUTPATIENT
Start: 2024-09-04 | End: 2024-09-06 | Stop reason: HOSPADM

## 2024-09-04 RX ORDER — GABAPENTIN 300 MG/1
300 CAPSULE ORAL 3 TIMES DAILY
Status: DISCONTINUED | OUTPATIENT
Start: 2024-09-04 | End: 2024-09-06 | Stop reason: HOSPADM

## 2024-09-04 RX ORDER — OXYCODONE HYDROCHLORIDE 5 MG/1
5 TABLET ORAL PRN
Status: DISCONTINUED | OUTPATIENT
Start: 2024-09-04 | End: 2024-09-04 | Stop reason: HOSPADM

## 2024-09-04 RX ORDER — SODIUM CHLORIDE, SODIUM LACTATE, POTASSIUM CHLORIDE, CALCIUM CHLORIDE 600; 310; 30; 20 MG/100ML; MG/100ML; MG/100ML; MG/100ML
INJECTION, SOLUTION INTRAVENOUS CONTINUOUS
Status: DISCONTINUED | OUTPATIENT
Start: 2024-09-04 | End: 2024-09-04

## 2024-09-04 RX ORDER — LIDOCAINE HYDROCHLORIDE 20 MG/ML
INJECTION, SOLUTION EPIDURAL; INFILTRATION; INTRACAUDAL; PERINEURAL PRN
Status: DISCONTINUED | OUTPATIENT
Start: 2024-09-04 | End: 2024-09-04 | Stop reason: SDUPTHER

## 2024-09-04 RX ORDER — SODIUM CHLORIDE 0.9 % (FLUSH) 0.9 %
5-40 SYRINGE (ML) INJECTION PRN
Status: DISCONTINUED | OUTPATIENT
Start: 2024-09-04 | End: 2024-09-04 | Stop reason: HOSPADM

## 2024-09-04 RX ORDER — ONDANSETRON 2 MG/ML
4 INJECTION INTRAMUSCULAR; INTRAVENOUS EVERY 6 HOURS PRN
Status: DISCONTINUED | OUTPATIENT
Start: 2024-09-04 | End: 2024-09-06 | Stop reason: HOSPADM

## 2024-09-04 RX ORDER — ENOXAPARIN SODIUM 100 MG/ML
30 INJECTION SUBCUTANEOUS EVERY 12 HOURS SCHEDULED
Status: DISCONTINUED | OUTPATIENT
Start: 2024-09-04 | End: 2024-09-06 | Stop reason: HOSPADM

## 2024-09-04 RX ORDER — HYDROMORPHONE HYDROCHLORIDE 1 MG/ML
0.5 INJECTION, SOLUTION INTRAMUSCULAR; INTRAVENOUS; SUBCUTANEOUS
Status: DISCONTINUED | OUTPATIENT
Start: 2024-09-04 | End: 2024-09-06 | Stop reason: HOSPADM

## 2024-09-04 RX ORDER — AMLODIPINE BESYLATE 5 MG/1
5 TABLET ORAL DAILY
Status: DISCONTINUED | OUTPATIENT
Start: 2024-09-04 | End: 2024-09-04 | Stop reason: SDUPTHER

## 2024-09-04 RX ORDER — HYDRALAZINE HYDROCHLORIDE 20 MG/ML
10 INJECTION INTRAMUSCULAR; INTRAVENOUS EVERY 6 HOURS PRN
Status: DISCONTINUED | OUTPATIENT
Start: 2024-09-04 | End: 2024-09-06 | Stop reason: HOSPADM

## 2024-09-04 RX ORDER — EPHEDRINE SULFATE 5 MG/ML
INJECTION INTRAVENOUS PRN
Status: DISCONTINUED | OUTPATIENT
Start: 2024-09-04 | End: 2024-09-04 | Stop reason: SDUPTHER

## 2024-09-04 RX ORDER — SODIUM CHLORIDE 0.9 % (FLUSH) 0.9 %
5-40 SYRINGE (ML) INJECTION PRN
Status: DISCONTINUED | OUTPATIENT
Start: 2024-09-04 | End: 2024-09-06 | Stop reason: HOSPADM

## 2024-09-04 RX ORDER — SODIUM CHLORIDE AND POTASSIUM CHLORIDE 150; 900 MG/100ML; MG/100ML
INJECTION, SOLUTION INTRAVENOUS CONTINUOUS
Status: DISCONTINUED | OUTPATIENT
Start: 2024-09-05 | End: 2024-09-06 | Stop reason: HOSPADM

## 2024-09-04 RX ORDER — ALBUTEROL SULFATE 0.83 MG/ML
2.5 SOLUTION RESPIRATORY (INHALATION) EVERY 4 HOURS PRN
Status: DISCONTINUED | OUTPATIENT
Start: 2024-09-04 | End: 2024-09-06 | Stop reason: HOSPADM

## 2024-09-04 RX ORDER — GLYCOPYRROLATE 0.2 MG/ML
INJECTION INTRAMUSCULAR; INTRAVENOUS PRN
Status: DISCONTINUED | OUTPATIENT
Start: 2024-09-04 | End: 2024-09-04 | Stop reason: SDUPTHER

## 2024-09-04 RX ORDER — DIPHENHYDRAMINE HYDROCHLORIDE 50 MG/ML
12.5 INJECTION INTRAMUSCULAR; INTRAVENOUS
Status: DISCONTINUED | OUTPATIENT
Start: 2024-09-04 | End: 2024-09-04 | Stop reason: HOSPADM

## 2024-09-04 RX ORDER — LIDOCAINE HYDROCHLORIDE ANHYDROUS AND DEXTROSE MONOHYDRATE 5; 400 G/100ML; MG/100ML
1 INJECTION, SOLUTION INTRAVENOUS CONTINUOUS
Status: ACTIVE | OUTPATIENT
Start: 2024-09-04 | End: 2024-09-05

## 2024-09-04 RX ORDER — SODIUM CHLORIDE 9 MG/ML
INJECTION, SOLUTION INTRAVENOUS PRN
Status: DISCONTINUED | OUTPATIENT
Start: 2024-09-04 | End: 2024-09-04 | Stop reason: HOSPADM

## 2024-09-04 RX ORDER — MIDAZOLAM HYDROCHLORIDE 1 MG/ML
INJECTION INTRAMUSCULAR; INTRAVENOUS PRN
Status: DISCONTINUED | OUTPATIENT
Start: 2024-09-04 | End: 2024-09-04 | Stop reason: SDUPTHER

## 2024-09-04 RX ORDER — LIDOCAINE HYDROCHLORIDE ANHYDROUS AND DEXTROSE MONOHYDRATE 5; 400 G/100ML; MG/100ML
INJECTION, SOLUTION INTRAVENOUS CONTINUOUS PRN
Status: DISCONTINUED | OUTPATIENT
Start: 2024-09-04 | End: 2024-09-04 | Stop reason: SDUPTHER

## 2024-09-04 RX ORDER — ONDANSETRON 2 MG/ML
INJECTION INTRAMUSCULAR; INTRAVENOUS PRN
Status: DISCONTINUED | OUTPATIENT
Start: 2024-09-04 | End: 2024-09-04 | Stop reason: SDUPTHER

## 2024-09-04 RX ORDER — NEOSTIGMINE METHYLSULFATE 1 MG/ML
INJECTION INTRAVENOUS PRN
Status: DISCONTINUED | OUTPATIENT
Start: 2024-09-04 | End: 2024-09-04 | Stop reason: SDUPTHER

## 2024-09-04 RX ORDER — SODIUM CHLORIDE 0.9 % (FLUSH) 0.9 %
5-40 SYRINGE (ML) INJECTION EVERY 12 HOURS SCHEDULED
Status: DISCONTINUED | OUTPATIENT
Start: 2024-09-04 | End: 2024-09-04 | Stop reason: HOSPADM

## 2024-09-04 RX ORDER — FENTANYL CITRATE 50 UG/ML
INJECTION, SOLUTION INTRAMUSCULAR; INTRAVENOUS PRN
Status: DISCONTINUED | OUTPATIENT
Start: 2024-09-04 | End: 2024-09-04 | Stop reason: SDUPTHER

## 2024-09-04 RX ORDER — AMLODIPINE BESYLATE 5 MG/1
5 TABLET ORAL DAILY
Status: DISCONTINUED | OUTPATIENT
Start: 2024-09-05 | End: 2024-09-06 | Stop reason: HOSPADM

## 2024-09-04 RX ORDER — OXYCODONE HYDROCHLORIDE 5 MG/1
10 TABLET ORAL PRN
Status: DISCONTINUED | OUTPATIENT
Start: 2024-09-04 | End: 2024-09-04 | Stop reason: HOSPADM

## 2024-09-04 RX ORDER — SODIUM CHLORIDE, SODIUM LACTATE, POTASSIUM CHLORIDE, CALCIUM CHLORIDE 600; 310; 30; 20 MG/100ML; MG/100ML; MG/100ML; MG/100ML
INJECTION, SOLUTION INTRAVENOUS CONTINUOUS
Status: DISCONTINUED | OUTPATIENT
Start: 2024-09-04 | End: 2024-09-04 | Stop reason: HOSPADM

## 2024-09-04 RX ORDER — PROCHLORPERAZINE EDISYLATE 5 MG/ML
10 INJECTION INTRAMUSCULAR; INTRAVENOUS EVERY 6 HOURS PRN
Status: DISCONTINUED | OUTPATIENT
Start: 2024-09-04 | End: 2024-09-06 | Stop reason: HOSPADM

## 2024-09-04 RX ORDER — KETAMINE HCL IN NACL, ISO-OSM 20 MG/2 ML
10 SYRINGE (ML) INJECTION EVERY 10 MIN PRN
Status: DISCONTINUED | OUTPATIENT
Start: 2024-09-04 | End: 2024-09-04 | Stop reason: HOSPADM

## 2024-09-04 RX ORDER — ONDANSETRON 2 MG/ML
4 INJECTION INTRAMUSCULAR; INTRAVENOUS
Status: COMPLETED | OUTPATIENT
Start: 2024-09-04 | End: 2024-09-04

## 2024-09-04 RX ORDER — DEXAMETHASONE SODIUM PHOSPHATE 10 MG/ML
INJECTION INTRAMUSCULAR; INTRAVENOUS PRN
Status: DISCONTINUED | OUTPATIENT
Start: 2024-09-04 | End: 2024-09-04 | Stop reason: SDUPTHER

## 2024-09-04 RX ORDER — NALOXONE HYDROCHLORIDE 0.4 MG/ML
INJECTION, SOLUTION INTRAMUSCULAR; INTRAVENOUS; SUBCUTANEOUS PRN
Status: DISCONTINUED | OUTPATIENT
Start: 2024-09-04 | End: 2024-09-04 | Stop reason: HOSPADM

## 2024-09-04 RX ORDER — ROCURONIUM BROMIDE 10 MG/ML
INJECTION, SOLUTION INTRAVENOUS PRN
Status: DISCONTINUED | OUTPATIENT
Start: 2024-09-04 | End: 2024-09-04 | Stop reason: SDUPTHER

## 2024-09-04 RX ORDER — BUPIVACAINE HYDROCHLORIDE 5 MG/ML
INJECTION, SOLUTION EPIDURAL; INTRACAUDAL PRN
Status: DISCONTINUED | OUTPATIENT
Start: 2024-09-04 | End: 2024-09-04 | Stop reason: ALTCHOICE

## 2024-09-04 RX ORDER — OXYCODONE HYDROCHLORIDE 5 MG/1
5 TABLET ORAL EVERY 4 HOURS PRN
Status: DISCONTINUED | OUTPATIENT
Start: 2024-09-04 | End: 2024-09-06 | Stop reason: HOSPADM

## 2024-09-04 RX ORDER — METOCLOPRAMIDE HYDROCHLORIDE 5 MG/ML
10 INJECTION INTRAMUSCULAR; INTRAVENOUS ONCE
Status: COMPLETED | OUTPATIENT
Start: 2024-09-04 | End: 2024-09-04

## 2024-09-04 RX ORDER — SODIUM CHLORIDE 9 MG/ML
INJECTION, SOLUTION INTRAVENOUS PRN
Status: DISCONTINUED | OUTPATIENT
Start: 2024-09-04 | End: 2024-09-06 | Stop reason: HOSPADM

## 2024-09-04 RX ORDER — ACETAMINOPHEN 500 MG
1000 TABLET ORAL ONCE
Status: COMPLETED | OUTPATIENT
Start: 2024-09-04 | End: 2024-09-04

## 2024-09-04 RX ORDER — SODIUM CHLORIDE 0.9 % (FLUSH) 0.9 %
5-40 SYRINGE (ML) INJECTION EVERY 12 HOURS SCHEDULED
Status: DISCONTINUED | OUTPATIENT
Start: 2024-09-04 | End: 2024-09-06 | Stop reason: HOSPADM

## 2024-09-04 RX ORDER — DIPHENHYDRAMINE HCL 25 MG
25 CAPSULE ORAL EVERY 6 HOURS PRN
Status: DISCONTINUED | OUTPATIENT
Start: 2024-09-04 | End: 2024-09-06 | Stop reason: HOSPADM

## 2024-09-04 RX ORDER — LIDOCAINE HYDROCHLORIDE 10 MG/ML
1 INJECTION, SOLUTION INFILTRATION; PERINEURAL
Status: DISCONTINUED | OUTPATIENT
Start: 2024-09-04 | End: 2024-09-04 | Stop reason: HOSPADM

## 2024-09-04 RX ADMIN — SODIUM CHLORIDE, SODIUM LACTATE, POTASSIUM CHLORIDE, AND CALCIUM CHLORIDE: 600; 310; 30; 20 INJECTION, SOLUTION INTRAVENOUS at 14:04

## 2024-09-04 RX ADMIN — LIDOCAINE HYDROCHLORIDE 100 MG: 20 INJECTION, SOLUTION EPIDURAL; INFILTRATION; INTRACAUDAL; PERINEURAL at 13:51

## 2024-09-04 RX ADMIN — FENTANYL CITRATE 100 MCG: 50 INJECTION, SOLUTION INTRAMUSCULAR; INTRAVENOUS at 13:51

## 2024-09-04 RX ADMIN — OXYCODONE HYDROCHLORIDE 5 MG: 5 TABLET ORAL at 18:33

## 2024-09-04 RX ADMIN — NEOSTIGMINE METHYLSULFATE 5 MG: 1 INJECTION, SOLUTION INTRAVENOUS at 14:49

## 2024-09-04 RX ADMIN — GLYCOPYRROLATE 0.5 MG: 0.2 INJECTION INTRAMUSCULAR; INTRAVENOUS at 14:49

## 2024-09-04 RX ADMIN — SIMETHICONE 80 MG: 80 TABLET, CHEWABLE ORAL at 21:23

## 2024-09-04 RX ADMIN — ROCURONIUM BROMIDE 10 MG: 10 INJECTION, SOLUTION INTRAVENOUS at 14:18

## 2024-09-04 RX ADMIN — ACETAMINOPHEN 650 MG: 325 TABLET, FILM COATED ORAL at 18:30

## 2024-09-04 RX ADMIN — HYDROMORPHONE HYDROCHLORIDE 0.5 MG: 1 INJECTION, SOLUTION INTRAMUSCULAR; INTRAVENOUS; SUBCUTANEOUS at 15:27

## 2024-09-04 RX ADMIN — KETOROLAC TROMETHAMINE 30 MG: 30 INJECTION, SOLUTION INTRAMUSCULAR at 14:51

## 2024-09-04 RX ADMIN — HYDROMORPHONE HYDROCHLORIDE 0.5 MG: 1 INJECTION, SOLUTION INTRAMUSCULAR; INTRAVENOUS; SUBCUTANEOUS at 15:42

## 2024-09-04 RX ADMIN — GABAPENTIN 300 MG: 300 CAPSULE ORAL at 21:23

## 2024-09-04 RX ADMIN — SUCRALFATE 1 G: 1 TABLET ORAL at 18:45

## 2024-09-04 RX ADMIN — Medication 3000 MG: at 13:43

## 2024-09-04 RX ADMIN — THIAMINE HYDROCHLORIDE: 100 INJECTION, SOLUTION INTRAMUSCULAR; INTRAVENOUS at 18:28

## 2024-09-04 RX ADMIN — LIDOCAINE HYDROCHLORIDE 1 MG/KG/HR: 4 INJECTION, SOLUTION INTRAVENOUS at 14:22

## 2024-09-04 RX ADMIN — HYDROMORPHONE HYDROCHLORIDE 0.5 MG: 1 INJECTION, SOLUTION INTRAMUSCULAR; INTRAVENOUS; SUBCUTANEOUS at 15:17

## 2024-09-04 RX ADMIN — ENOXAPARIN SODIUM 30 MG: 100 INJECTION SUBCUTANEOUS at 21:23

## 2024-09-04 RX ADMIN — Medication 10 MG: at 14:14

## 2024-09-04 RX ADMIN — ONDANSETRON 4 MG: 2 INJECTION INTRAMUSCULAR; INTRAVENOUS at 14:17

## 2024-09-04 RX ADMIN — Medication 20 MG: at 13:51

## 2024-09-04 RX ADMIN — Medication 10 MG: at 16:21

## 2024-09-04 RX ADMIN — MIDAZOLAM 2 MG: 1 INJECTION INTRAMUSCULAR; INTRAVENOUS at 13:43

## 2024-09-04 RX ADMIN — SODIUM CHLORIDE, SODIUM LACTATE, POTASSIUM CHLORIDE, AND CALCIUM CHLORIDE: 600; 310; 30; 20 INJECTION, SOLUTION INTRAVENOUS at 11:47

## 2024-09-04 RX ADMIN — ONDANSETRON 4 MG: 2 INJECTION INTRAMUSCULAR; INTRAVENOUS at 15:36

## 2024-09-04 RX ADMIN — GLYCOPYRROLATE 0.2 MG: 0.2 INJECTION INTRAMUSCULAR; INTRAVENOUS at 14:05

## 2024-09-04 RX ADMIN — Medication 10 MG: at 14:42

## 2024-09-04 RX ADMIN — SODIUM CHLORIDE, PRESERVATIVE FREE 40 MG: 5 INJECTION INTRAVENOUS at 18:33

## 2024-09-04 RX ADMIN — DEXAMETHASONE SODIUM PHOSPHATE 10 MG: 10 INJECTION INTRAMUSCULAR; INTRAVENOUS at 14:11

## 2024-09-04 RX ADMIN — KETOROLAC TROMETHAMINE 30 MG: 30 INJECTION, SOLUTION INTRAMUSCULAR at 21:23

## 2024-09-04 RX ADMIN — METOCLOPRAMIDE 10 MG: 5 INJECTION, SOLUTION INTRAMUSCULAR; INTRAVENOUS at 11:46

## 2024-09-04 RX ADMIN — ROCURONIUM BROMIDE 40 MG: 10 INJECTION, SOLUTION INTRAVENOUS at 13:51

## 2024-09-04 RX ADMIN — EPHEDRINE SULFATE 10 MG: 5 INJECTION INTRAVENOUS at 14:09

## 2024-09-04 RX ADMIN — ACETAMINOPHEN 1000 MG: 500 TABLET, FILM COATED ORAL at 11:40

## 2024-09-04 RX ADMIN — SODIUM CHLORIDE 150 MG: 9 INJECTION, SOLUTION INTRAVENOUS at 11:39

## 2024-09-04 ASSESSMENT — PAIN SCALES - GENERAL
PAINLEVEL_OUTOF10: 4
PAINLEVEL_OUTOF10: 6
PAINLEVEL_OUTOF10: 4
PAINLEVEL_OUTOF10: 7
PAINLEVEL_OUTOF10: 4
PAINLEVEL_OUTOF10: 7
PAINLEVEL_OUTOF10: 8
PAINLEVEL_OUTOF10: 6
PAINLEVEL_OUTOF10: 5
PAINLEVEL_OUTOF10: 6
PAINLEVEL_OUTOF10: 7

## 2024-09-04 ASSESSMENT — PAIN DESCRIPTION - PAIN TYPE: TYPE: SURGICAL PAIN

## 2024-09-04 ASSESSMENT — PAIN DESCRIPTION - LOCATION
LOCATION: ABDOMEN

## 2024-09-04 ASSESSMENT — PAIN DESCRIPTION - DESCRIPTORS
DESCRIPTORS: DISCOMFORT
DESCRIPTORS: ACHING;CRAMPING
DESCRIPTORS: PATIENT UNABLE TO DESCRIBE
DESCRIPTORS: DISCOMFORT

## 2024-09-04 ASSESSMENT — PAIN DESCRIPTION - ONSET: ONSET: AWAKENED FROM SLEEP

## 2024-09-04 ASSESSMENT — PAIN - FUNCTIONAL ASSESSMENT: PAIN_FUNCTIONAL_ASSESSMENT: 0-10

## 2024-09-04 ASSESSMENT — PAIN DESCRIPTION - FREQUENCY: FREQUENCY: CONTINUOUS

## 2024-09-04 ASSESSMENT — PAIN DESCRIPTION - ORIENTATION: ORIENTATION: LOWER

## 2024-09-04 NOTE — PERIOP NOTE
MD Ashraf  at bedside with patient. Pt VSS stable. Pain and Nausea controlled at this time. Verbal sign out per MD when pacu care is completed. Plan of care continues.

## 2024-09-04 NOTE — ANESTHESIA POSTPROCEDURE EVALUATION
Department of Anesthesiology  Postprocedure Note    Patient: Anali Strauss  MRN: [id number]  YOB: 1985  Date of evaluation: 9/4/2024    Procedure Summary       Date: 09/04/24 Room / Location: [facility] MAIN OR 03 / [facility] MAIN OR    Anesthesia Start: 1340 Anesthesia Stop: 1513    Procedure: ERAS/ GASTRECTOMY SLEEVE LAPAROSCOPIC (Abdomen) Diagnosis:       Morbid obesity (HCC)      (Morbid obesity (HCC) [E66.01])    Surgeons: Geoffrey Cohn MD Responsible Provider: Shan Uriarte IV, MD    Anesthesia Type: general ASA Status: 3            Anesthesia Type: No value filed.    Justin Phase I:      Justin Phase II:      Anesthesia Post Evaluation    Patient location during evaluation: PACU  Patient participation: complete - patient participated  Level of consciousness: awake and alert  Airway patency: patent  Nausea & Vomiting: no nausea and no vomiting  Cardiovascular status: hemodynamically stable  Respiratory status: acceptable  Hydration status: euvolemic  Comments: Blood pressure 128/86, pulse 89, temperature 98.4 °F (36.9 °C), temperature source Skin, resp. rate 23, height 1.549 m (5' 0.98\"), weight (!) 141.7 kg (312 lb 8 oz), SpO2 94%.    No apparent anesthetic complications.  Pt stable for discharge from PACU  Multimodal analgesia pain management approach  Pain management: adequate    No notable events documented.

## 2024-09-04 NOTE — INTERVAL H&P NOTE
Update History & Physical    The patient's History and Physical of 8/28/24 was reviewed with the patient and I examined the patient. There was no change. The surgical site was confirmed by the patient and me.     Plan: The risks, benefits, expected outcome, and alternative to the recommended procedure have been discussed with the patient. Patient understands and wants to proceed with the procedure.     Electronically signed by MICKEY KAPOOR MD on 9/4/2024 at 1:08 PM

## 2024-09-04 NOTE — PERIOP NOTE
MD Ashraf notified of patient's 7/10 pain as well as drowsiness. MD verbal order 10mg IV ketamine PRN q10 minutes up to 20mg if needed.

## 2024-09-04 NOTE — PERIOP NOTE
TRANSFER - OUT REPORT:    Verbal report given to Nusrat OJEDA on Anali M Day  being transferred to [facility] for routine progression of patient care       Report consisted of patient's Situation, Background, Assessment and   Recommendations(SBAR).     Information from the following report(s) Nurse Handoff Report, Adult Overview, Surgery Report, MAR, and Cardiac Rhythm SR  was reviewed with the receiving nurse.           Lines:   Peripheral IV 09/04/24 Left;Posterior Hand (Active)   Site Assessment Clean, dry & intact 09/04/24 1344   Line Status Infusing 09/04/24 1344   Phlebitis Assessment No symptoms 09/04/24 1344   Infiltration Assessment 0 09/04/24 1344   Alcohol Cap Used No 09/04/24 1344   Dressing Status Clean, dry & intact;New dressing applied 09/04/24 1344   Dressing Type Transparent 09/04/24 1344       Peripheral IV 09/04/24 Posterior;Right Hand (Active)   Site Assessment Clean, dry & intact 09/04/24 1344   Line Status Infusing 09/04/24 1344   Phlebitis Assessment No symptoms 09/04/24 1344   Infiltration Assessment 0 09/04/24 1344   Alcohol Cap Used No 09/04/24 1344   Dressing Status New dressing applied 09/04/24 1344   Dressing Type Transparent 09/04/24 1344        Opportunity for questions and clarification was provided.      Patient transported with:  O2 @ 2lpm

## 2024-09-04 NOTE — OP NOTE
Laparascopic Sleeve Gastrectomy Operative Report    Date of Surgery: 9/4/2024     Preoperative Diagnosis: Morbid Obesity with a BMI of 60    Postoperative Diagnosis: Same as the above    Procedure: Procedure(s):  ERAS/ GASTRECTOMY SLEEVE LAPAROSCOPIC     Surgeons and Role:     * Geoffrey Cohn MD - Primary     Anesthesia:  GETA plus local     Surgical Assistant: None    NAME OF PROCEDURE: LAPAROSCOPIC SLEEVE GASTRECTOMY    ESTIMATED BLOOD LOSS: 25 mL.    SPECIMENS: Segment of the stomach.    HISTORY: This is a 39 y.o. female who came to the surgical weight loss  clinic at [facility] of her own volition for consideration of bariatric surgery. The patient went to an information session, met with the dietician and psychologist. she came in and I discussed a gastric bypass versus a sleeve gastrectomy with the patient. I recommended a sleeve gastrectomy. The  patient agreed and signed a consent form. For risks, I mentioned risks of bleeding, infection, anesthesia, injury to the esophagus, stomach, small bowel, liver, spleen, large bowel.  I also went through risks of myocardial infarction, pneumonia and leak from the staple line of the sleeve gastrectomy. I said that a leak could produce peritonitis, multi-system organ failure, and even death. There is a 1% nationwide mortality rate for this procedure. The patient understood and still wished to proceed.    PROCEDURE IN DETAIL: The patient was brought to an operating room at the [facility] where general endotracheal anesthesia was administered without complications. She received 2 grams of Ancef as prophylactic antibiotic coverage. The nursing staff applied sequential compression devices which were used throughout the procedure.The abdomen was prepped and draped in the usual sterile manner. An incision was made superior into the patient's left of the umbilicus overlying the left rectus abdominis muscle. An Optiview trocar was placed in the  peritoneal cavity under direct vision with 0 degree 10 mm scope. Once in the peritoneal cavity, the abdomen was insufflated to 15 mmHg using carbon dioxide gas. The table was placed in reverse Trendelenburg position. A 5 and 15 mm trocar placed in the right upper quadrant under direct vision.  A 5 mm trocar was placed in the epigastric region and then removed. Through this tract, a Alex liver retractor was placed and used to retract the left lobe of the liver throughout the remainder of the procedure. It was attached to an Omni self-retaining retracting device. A 5 mm trocar was placed in the left upper quadrant to be used for retraction throughout the procedure.    We found the pylorus and measured 6 cm proximal to the pylorus in an area along the greater curvature. The nurse anesthetist placed a 40 Fr Visi-G bougie/calibration tube which was manipulated along the lesser curvature. The device was attached to suction which fixed it in place. We made a small aperture with the Enseal Trio device. We then took the gastrocolic ligament from this point all the way up to the left juan of the diaphragm. We cleared off some posterior adhesions with the Enseal device as well. We then used the [place] 60 stapling device. The first three cartridges were green staple cartridges. We were able to push the bougie into the pre-pyloric channel and along the lesser curvature, where it remained. The next  staple cartridges were two blue cartridges until the stomach was completely divided along the calibration tube. We checked the staple line. The remnant of the stomach had a stitch of 0 Ethibond placed in the antrum area and the excised stomach was then placed into a large Cook Endopouch. The Endopouch was closed and brought up through the 15 mm trocar incision which was a enlarged with the electrocautery. The bag was removed including the excised stomach which was sent to pathology. The 15 mm trocar was returned. We checked the

## 2024-09-04 NOTE — ANESTHESIA PRE PROCEDURE
% injection 5-40 mL  5-40 mL IntraVENous PRN Virginia Romero PA       • 0.9 % sodium chloride infusion   IntraVENous PRN Virginia Romero PA       • ceFAZolin (ANCEF) 3000 mg in sterile water 30 mL IV syringe  3,000 mg IntraVENous On Call to OR Virginia Romero PA       • ondansetron (ZOFRAN) injection 4 mg  4 mg IntraVENous Once Virginia Romero PA       • scopolamine (TRANSDERM-SCOP) transdermal patch 1 patch  1 patch TransDERmal Q72H Virginia Romero PA   1 patch at 09/04/24 1140   • lidocaine 1 % injection 1 mL  1 mL IntraDERmal Once PRN Shan Uriarte IV, MD       • sodium chloride flush 0.9 % injection 5-40 mL  5-40 mL IntraVENous 2 times per day Shan Uriarte IV, MD       • sodium chloride flush 0.9 % injection 5-40 mL  5-40 mL IntraVENous PRN Shan Uriarte IV, MD       • midazolam PF (VERSED) injection 2 mg  2 mg IntraVENous Once PRN Shan Uriarte IV, MD           Allergies:    Allergies   Allergen Reactions   • Latex Other (See Comments)     Other Reaction(s): Rash-Allergy   • Aspirin Other (See Comments)     Other Reaction(s): Nausea and/or vomiting-Intolerance, Rash-Allergy   • Codeine Other (See Comments) and Anaphylaxis   • Metronidazole Anaphylaxis   • Morphine Other (See Comments)     \"burn all over\"    Other Reaction(s): Rash-Allergy    \"burn all over\"   \"burn all over\"   • Penicillins Rash and Anaphylaxis     Other Reaction(s): Rash-Allergy, Rash-Allergy   • Cephalexin Nausea And Vomiting   • Peanut (Diagnostic)      Other Reaction(s): Nausea and/or vomiting-Intolerance       Problem List:    Patient Active Problem List   Diagnosis Code   • Laparoscopic surgical procedure converted to open procedure Z53.31   • Pelvic mass in female R19.00   • Pelvic pain in female R10.2   • Obesity due to excess calories without serious comorbidity E66.09   • Allergic rhinitis J30.9   • Anxiety F41.9   • Morbid obesity with BMI of 50.0-59.9, adult ([facility]) E66.01, Z68.43   • Asthma 
Unknown if ever smoked

## 2024-09-05 LAB
ANION GAP SERPL CALC-SCNC: 11 MMOL/L (ref 9–18)
BASOPHILS # BLD: 0 K/UL (ref 0–0.2)
BASOPHILS NFR BLD: 0 % (ref 0–2)
BUN SERPL-MCNC: 6 MG/DL (ref 6–23)
CALCIUM SERPL-MCNC: 8.9 MG/DL (ref 8.8–10.2)
CHLORIDE SERPL-SCNC: 106 MMOL/L (ref 98–107)
CO2 SERPL-SCNC: 20 MMOL/L (ref 20–28)
CREAT SERPL-MCNC: 0.63 MG/DL (ref 0.6–1.1)
DIFFERENTIAL METHOD BLD: ABNORMAL
EOSINOPHIL # BLD: 0 K/UL (ref 0–0.8)
EOSINOPHIL NFR BLD: 0 % (ref 0.5–7.8)
ERYTHROCYTE [DISTWIDTH] IN BLOOD BY AUTOMATED COUNT: 13.8 % (ref 11.9–14.6)
GLUCOSE SERPL-MCNC: 115 MG/DL (ref 70–99)
HCT VFR BLD AUTO: 40.4 % (ref 35.8–46.3)
HGB BLD-MCNC: 12.6 G/DL (ref 11.7–15.4)
IMM GRANULOCYTES # BLD AUTO: 0 K/UL (ref 0–0.5)
IMM GRANULOCYTES NFR BLD AUTO: 0 % (ref 0–5)
LYMPHOCYTES # BLD: 0.9 K/UL (ref 0.5–4.6)
LYMPHOCYTES NFR BLD: 6 % (ref 13–44)
MCH RBC QN AUTO: 28.2 PG (ref 26.1–32.9)
MCHC RBC AUTO-ENTMCNC: 31.2 G/DL (ref 31.4–35)
MCV RBC AUTO: 90.4 FL (ref 82–102)
MONOCYTES # BLD: 0.3 K/UL (ref 0.1–1.3)
MONOCYTES NFR BLD: 2 % (ref 4–12)
NEUTS SEG # BLD: 13.7 K/UL (ref 1.7–8.2)
NEUTS SEG NFR BLD: 92 % (ref 43–78)
NRBC # BLD: 0 K/UL (ref 0–0.2)
PLATELET # BLD AUTO: 406 K/UL (ref 150–450)
PMV BLD AUTO: 11.3 FL (ref 9.4–12.3)
POTASSIUM SERPL-SCNC: 4.8 MMOL/L (ref 3.5–5.1)
RBC # BLD AUTO: 4.47 M/UL (ref 4.05–5.2)
SODIUM SERPL-SCNC: 137 MMOL/L (ref 136–145)
WBC # BLD AUTO: 14.9 K/UL (ref 4.3–11.1)

## 2024-09-05 PROCEDURE — 96374 THER/PROPH/DIAG INJ IV PUSH: CPT

## 2024-09-05 PROCEDURE — 96376 TX/PRO/DX INJ SAME DRUG ADON: CPT

## 2024-09-05 PROCEDURE — 85025 COMPLETE CBC W/AUTO DIFF WBC: CPT

## 2024-09-05 PROCEDURE — 2580000003 HC RX 258: Performed by: PHYSICIAN ASSISTANT

## 2024-09-05 PROCEDURE — 6360000002 HC RX W HCPCS: Performed by: PHYSICIAN ASSISTANT

## 2024-09-05 PROCEDURE — 6370000000 HC RX 637 (ALT 250 FOR IP): Performed by: SURGERY

## 2024-09-05 PROCEDURE — 80048 BASIC METABOLIC PNL TOTAL CA: CPT

## 2024-09-05 PROCEDURE — 96372 THER/PROPH/DIAG INJ SC/IM: CPT

## 2024-09-05 PROCEDURE — APPSS30 APP SPLIT SHARED TIME 16-30 MINUTES: Performed by: PHYSICIAN ASSISTANT

## 2024-09-05 PROCEDURE — 96375 TX/PRO/DX INJ NEW DRUG ADDON: CPT

## 2024-09-05 PROCEDURE — 6370000000 HC RX 637 (ALT 250 FOR IP): Performed by: PHYSICIAN ASSISTANT

## 2024-09-05 PROCEDURE — 97530 THERAPEUTIC ACTIVITIES: CPT

## 2024-09-05 PROCEDURE — 97161 PT EVAL LOW COMPLEX 20 MIN: CPT

## 2024-09-05 PROCEDURE — G0378 HOSPITAL OBSERVATION PER HR: HCPCS

## 2024-09-05 PROCEDURE — 36415 COLL VENOUS BLD VENIPUNCTURE: CPT

## 2024-09-05 PROCEDURE — 94760 N-INVAS EAR/PLS OXIMETRY 1: CPT

## 2024-09-05 RX ADMIN — ACETAMINOPHEN 650 MG: 325 TABLET, FILM COATED ORAL at 00:32

## 2024-09-05 RX ADMIN — ACETAMINOPHEN 650 MG: 325 TABLET, FILM COATED ORAL at 18:32

## 2024-09-05 RX ADMIN — ACETAMINOPHEN 650 MG: 325 TABLET, FILM COATED ORAL at 05:38

## 2024-09-05 RX ADMIN — ENOXAPARIN SODIUM 30 MG: 100 INJECTION SUBCUTANEOUS at 10:39

## 2024-09-05 RX ADMIN — OXYCODONE HYDROCHLORIDE 5 MG: 5 TABLET ORAL at 22:12

## 2024-09-05 RX ADMIN — SODIUM CHLORIDE, PRESERVATIVE FREE 40 MG: 5 INJECTION INTRAVENOUS at 08:41

## 2024-09-05 RX ADMIN — SODIUM CHLORIDE, PRESERVATIVE FREE 10 ML: 5 INJECTION INTRAVENOUS at 08:49

## 2024-09-05 RX ADMIN — KETOROLAC TROMETHAMINE 30 MG: 30 INJECTION, SOLUTION INTRAMUSCULAR at 04:07

## 2024-09-05 RX ADMIN — POTASSIUM CHLORIDE AND SODIUM CHLORIDE: 900; 150 INJECTION, SOLUTION INTRAVENOUS at 04:07

## 2024-09-05 RX ADMIN — GABAPENTIN 300 MG: 300 CAPSULE ORAL at 21:14

## 2024-09-05 RX ADMIN — OXYCODONE HYDROCHLORIDE 5 MG: 5 TABLET ORAL at 01:57

## 2024-09-05 RX ADMIN — SUCRALFATE 1 G: 1 TABLET ORAL at 05:38

## 2024-09-05 RX ADMIN — AMLODIPINE BESYLATE 5 MG: 5 TABLET ORAL at 08:39

## 2024-09-05 RX ADMIN — ENOXAPARIN SODIUM 30 MG: 100 INJECTION SUBCUTANEOUS at 21:14

## 2024-09-05 RX ADMIN — POTASSIUM CHLORIDE AND SODIUM CHLORIDE: 900; 150 INJECTION, SOLUTION INTRAVENOUS at 12:52

## 2024-09-05 RX ADMIN — GABAPENTIN 300 MG: 300 CAPSULE ORAL at 15:46

## 2024-09-05 RX ADMIN — FLUOXETINE HYDROCHLORIDE 60 MG: 20 CAPSULE ORAL at 08:59

## 2024-09-05 RX ADMIN — GABAPENTIN 300 MG: 300 CAPSULE ORAL at 08:39

## 2024-09-05 RX ADMIN — SUCRALFATE 1 G: 1 TABLET ORAL at 18:32

## 2024-09-05 RX ADMIN — KETOROLAC TROMETHAMINE 30 MG: 30 INJECTION, SOLUTION INTRAMUSCULAR at 08:49

## 2024-09-05 RX ADMIN — SUCRALFATE 1 G: 1 TABLET ORAL at 12:46

## 2024-09-05 RX ADMIN — ACETAMINOPHEN 650 MG: 325 TABLET, FILM COATED ORAL at 12:46

## 2024-09-05 RX ADMIN — SUCRALFATE 1 G: 1 TABLET ORAL at 00:31

## 2024-09-05 RX ADMIN — POTASSIUM CHLORIDE AND SODIUM CHLORIDE: 900; 150 INJECTION, SOLUTION INTRAVENOUS at 21:17

## 2024-09-05 ASSESSMENT — PAIN SCALES - GENERAL
PAINLEVEL_OUTOF10: 6
PAINLEVEL_OUTOF10: 9
PAINLEVEL_OUTOF10: 8
PAINLEVEL_OUTOF10: 9
PAINLEVEL_OUTOF10: 7
PAINLEVEL_OUTOF10: 8
PAINLEVEL_OUTOF10: 5

## 2024-09-05 ASSESSMENT — PAIN DESCRIPTION - LOCATION
LOCATION: ABDOMEN
LOCATION: ABDOMEN;BACK
LOCATION: ABDOMEN

## 2024-09-05 ASSESSMENT — PAIN DESCRIPTION - DESCRIPTORS
DESCRIPTORS: ACHING
DESCRIPTORS: ACHING;DISCOMFORT
DESCRIPTORS: ACHING

## 2024-09-05 NOTE — CARE COORDINATION
CASE MANAGEMENT ASSESSMENT NOTE    Patient is a 39 year old female post gastrectomy sleeve.    Patient assessment completed at bedside.  Patient presents to assessment alert and oriented, and answers questions appropriately.  She lives at home with her spouse and children.  At baseline, she is independent with transfers, does not use any assistive devices.  Patient has medicaid insurance.  PCP is JORGE A Rouse.  At this time, patient does not anticipate any additional needs.  PT eval has been ordered.    At this time, anticipate patient to be discharged home with no additional needs.  PT eval has been ordered and awaiting recommendations.  Case management will continue to follow.  Please notify if there are any changes.     Attending Physician: Geoffrey Cohn MD  Admit Problem: Morbid obesity (HCC) [E66.01]  Date/Time of Admission: 9/4/2024 11:01 AM  Problem List:  Patient Active Problem List   Diagnosis    Laparoscopic surgical procedure converted to open procedure    Pelvic mass in female    Pelvic pain in female    Obesity due to excess calories without serious comorbidity    Allergic rhinitis    Anxiety    Morbid obesity with BMI of 50.0-59.9, adult (HCC)    Asthma    Chronic GERD    Dysthymia    Iron deficiency anemia due to chronic blood loss    Mood disorder (HCC)    Extrinsic asthma without status asthmaticus    Vitamin D deficiency    Primary hypertension    Morbid obesity (HCC)          09/05/24 0926   Service Assessment   Patient Orientation Alert and Oriented   Cognition Alert   History Provided By Patient   Primary Caregiver Self   Accompanied By/Relationship N/A   Support Systems Spouse/Significant Other   Patient's Healthcare Decision Maker is: Legal Next of Kin   PCP Verified by CM Yes  (JORGE A Rouse)   Prior Functional Level Independent in ADLs/IADLs   Current Functional Level Independent in ADLs/IADLs   Can patient return to prior living arrangement Yes   Ability to make needs known:  Good   Family able to assist with home care needs: Yes   Would you like for me to discuss the discharge plan with any other family members/significant others, and if so, who? Yes  (Sarkis Strauss, spouse)   Financial Resources Medicaid   Community Resources None   CM/PRERNA Referral Other (see comment)  (N/A)             Rahul Raygoza RN 09/05/24 9:28 AM

## 2024-09-05 NOTE — PLAN OF CARE
Problem: Pain  Goal: Verbalizes/displays adequate comfort level or baseline comfort level  9/5/2024 1428 by Royce Mac RN  Outcome: Progressing  9/5/2024 0457 by Leatha Aceves RN  Outcome: Progressing     Problem: Discharge Planning  Goal: Discharge to home or other facility with appropriate resources  9/5/2024 1428 by Royce Mac RN  Outcome: Progressing  9/5/2024 0457 by Leatha Aceves RN  Outcome: Progressing     Problem: ABCDS Injury Assessment  Goal: Absence of physical injury  9/5/2024 1428 by Royce Mac RN  Outcome: Progressing  9/5/2024 0457 by Leatha Aceves RN  Outcome: Progressing     Problem: Safety - Adult  Goal: Free from fall injury  9/5/2024 1428 by Royce Mac RN  Outcome: Progressing  9/5/2024 0457 by Leatha Aceves RN  Outcome: Progressing

## 2024-09-05 NOTE — PROGRESS NOTES
ACUTE PHYSICAL THERAPY GOALS:   (Developed with and agreed upon by patient and/or caregiver.)      LTG:  (1.)Ms. Strauss will move from supine to sit and sit to supine  in bed with INDEPENDENT within 3 treatment day(s).    (2.)Ms. Strauss will transfer from bed to chair and chair to bed with INDEPENDENT using the least restrictive device within 3 treatment day(s).    (3.)Ms. Strauss will ambulate with INDEPENDENT for 300 feet with the least restrictive device within 3 treatment day(s).  (4.) Ms. Strauss will be independent with their HEP.  ________________________________________________________________________________________________     PHYSICAL THERAPY Initial Assessment and AM  (Link to Caseload Tracking: PT Visit Days : 1  Acknowledge Orders  Time In/Out  PT Charge Capture  Rehab Caseload Tracker    Anali Strauss is a 39 y.o. female   PRIMARY DIAGNOSIS: Morbid obesity (HCC)  Morbid obesity (HCC) [E66.01]  Procedure(s) (LRB):  ERAS/ GASTRECTOMY SLEEVE LAPAROSCOPIC (N/A)  1 Day Post-Op  Reason for Referral: Generalized Muscle Weakness (M62.81)  Observation: Pay[place]: [place] / Plan: [place] / Product Type: *No Product type* /     ASSESSMENT:     REHAB RECOMMENDATIONS:   Recommendation to date pending progress:  Setting:  No further skilled physical therapy after discharge from hospital    Equipment:    None     ASSESSMENT:  Ms. Strauss presents with limited functional endurance and strength following her gastric sleeve surgery and will benefit from PT to insure functional independence and safety with transfers, gait, and HEP. She was sidelying on contact and was independent with bed mobility and Supervision for transfers. She will discharge home with family assist and should do fine without any further therapy. She stated she has been up walking around the room a lot but only out in the hallway once. I encouraged her to ambulated up to 5 times a day in the halls increasing her distance. I

## 2024-09-05 NOTE — ACP (ADVANCE CARE PLANNING)
Advance Care Planning   General Advance Care Planning (ACP) Conversation    Date of Conversation: 8/13/2024  Conducted with: Patient with Decision Making Capacity  Other persons present: None    Healthcare Decision Maker:  No healthcare decision makers have been documented.    Today we documented Decision Maker(s) consistent with Legal Next of Kin hierarchy.  Content/Action Overview:  DECLINED ACP Conversation - will revisit periodically  Reviewed DNR/DNI and patient   Length of Voluntary ACP Conversation in minutes:  <16 minutes (Non-Billable)    Rahul Raygoza RN

## 2024-09-05 NOTE — PLAN OF CARE
Problem: Pain  Goal: Verbalizes/displays adequate comfort level or baseline comfort level  Outcome: Progressing     Problem: Discharge Planning  Goal: Discharge to home or other facility with appropriate resources  Outcome: Progressing     Problem: ABCDS Injury Assessment  Goal: Absence of physical injury  Outcome: Progressing     Problem: Safety - Adult  Goal: Free from fall injury  Outcome: Progressing

## 2024-09-05 NOTE — DISCHARGE INSTRUCTIONS
Bariatric Surgery Discharge Instructions    Surgeon: Dr. Geoffrey Cohn    Follow up:  Follow up with your surgeon or physician assistant as previously scheduled in 1-2 weeks.  [facility] Surgical Weight Loss  Office number: [phone]    Diet:  When discharged from the hospital, you may begin clear and full liquid diet plus protein supplements  Start with clear liquids and progress to full liquids as tolerated  Goals: 60 grams of protein per day, 64 ounces of fluid per day  Avoid carbonated beverages and straws, avoid excessive air swallowing when drinking/eating, minimize caffeine intake. No alcohol.    Wound care:  Surgical glue will flake off in 7-10 days; the edges of steri-strips may come up but leave them in place until your follow up appointment  May shower following surgery. It is okay to get soap and water on all wounds when showering. Do not soak wounds under water (bath, pool, hot tub) until healed about three weeks after surgery.     Activity:  No lifting more than 20 lbs for 3-4 weeks. No repetitive abdominal straining (sit-ups, push-ups, crunches, pull-ups, squats), for 3 weeks. Okay to perform normal activities of daily living and walk up stairs. Walk daily. Continue deep breathing and use incentive spirometer at home.   Return to school or work when you fee comfortable. Typically 1-2 weeks for a desk job or up to 4 weeks for a manual labor job.   You may resume driving when you have minimal pain and are not taking narcotic pain medication.     Medications:  You will have been prescribed the following medications prior to discharge:  Percocet (5mg): take one pill by mouth every 4 hours as needed for pain  Zofran (8mg): take one pill by mouth every 8 hours for nausea or vomiting  Omeprazole (40mg): take one pill by mouth daily for 90 days  Use an over the counter stool softener (Miralax) or laxative (Ducolax, milk of magnesium) if you feel constipated. You may not have a normal

## 2024-09-05 NOTE — PROGRESS NOTES
Virginia Romero PA-C  Bariatric & Advanced Laparoscopic Surgery & Endoscopy    Bariatric Surgery Daily Progress Note    Patient: Anali Strauss  MRN: [id number]  Date: 9/5/2024 8:05 AM  Admit Date: 9/4/2024  Procedure: laparoscopic sleeve gastrectomy    Subjective:     Anali Strauss is a 39 y.o. female who is POD #1 s/p sleeve gastrectomy completed by Dr. Geoffrey Cohn. She reports that she is doing well but is still relatively tired and drowsy from anesthesia. She admits to significant right and left sided abdominal pain at the incision sites, rated 8-9/10 which is moderately improved with oral and IV pain medication. She denies nausea or vomiting, but does note dry mouth and dizziness when laying to sitting. She has been OOB ambulating the room, voiding without difficulty, using the incentive spirometer (2500+) and tolerating slow PO intake including protein shakes.     Review of Systems   General ROS: negative for - fever or chills  Psychological ROS: negative for - memory difficulties  Ophthalmic ROS: negative for - blurry vision or double vision  Respiratory ROS: negative for - cough, shortness of breath, or wheezing  Cardiovascular ROS: negative for - chest pain  Gastrointestinal ROS: negative for - nausea, vomiting or dry heaving  Musculoskeletal ROS: negative for - pain in the lower extremities  Neurological ROS: negative for - light headedness, headaches or numbness     Objective:     MEDS:    Current Facility-Administered Medications   Medication Dose Route Frequency Provider Last Rate Last Admin    diphenhydrAMINE (BENADRYL) capsule 25 mg  25 mg Oral Q6H PRN Virginia Romero PA        Or    diphenhydrAMINE (BENADRYL) injection 25 mg  25 mg IntraVENous Q6H PRN Virginia Romero PA        0.9% NaCl with KCl 20 mEq infusion   IntraVENous Continuous Virginia Romero  mL/hr at 09/05/24 0407 New Bag at 09/05/24 0407    sodium chloride flush 0.9 % injection 5-40 mL  5-40 mL IntraVENous  gastrectomy    Pain control   2.   Bariatric diet - clear liquids, Ensure Max protein  3.   Lap incisions C/D/I  4.   No tachycardia overnight   5.   Labs reviewed. WBC 14.9. Hgb stable 12.6 from 12.9.  6.   OOB and ambulate as tolerated. PT consulted.   7.   DVT prophylaxis - SCDs/Lovenox  8.   Monitor for improved pain management and increased PO intake    Discharge: to home later today or tomorrow    Virginia Romero PA-C  9/5/2024    Counseling time:counseling time more than 50% of visit: 20 minutes: I spent this time preparing to see patient (including chart review and preparation), obtaining and/or reviewing additional medical history, performing a physical exam and evaluation, documenting clinical information in the electronic health record, independently interpreting results, communicating results to patient, family or caregiver, and/or coordinating care.

## 2024-09-06 VITALS
HEIGHT: 61 IN | OXYGEN SATURATION: 100 % | SYSTOLIC BLOOD PRESSURE: 104 MMHG | DIASTOLIC BLOOD PRESSURE: 56 MMHG | HEART RATE: 57 BPM | BODY MASS INDEX: 55.32 KG/M2 | TEMPERATURE: 97.7 F | RESPIRATION RATE: 18 BRPM | WEIGHT: 293 LBS

## 2024-09-06 PROBLEM — E86.0 DEHYDRATION: Status: ACTIVE | Noted: 2024-09-06

## 2024-09-06 LAB
ANION GAP SERPL CALC-SCNC: 10 MMOL/L (ref 9–18)
BASOPHILS # BLD: 0 K/UL (ref 0–0.2)
BASOPHILS NFR BLD: 0 % (ref 0–2)
BUN SERPL-MCNC: 8 MG/DL (ref 6–23)
CALCIUM SERPL-MCNC: 8.3 MG/DL (ref 8.8–10.2)
CHLORIDE SERPL-SCNC: 109 MMOL/L (ref 98–107)
CO2 SERPL-SCNC: 22 MMOL/L (ref 20–28)
CREAT SERPL-MCNC: 0.63 MG/DL (ref 0.6–1.1)
DIFFERENTIAL METHOD BLD: ABNORMAL
EOSINOPHIL # BLD: 0 K/UL (ref 0–0.8)
EOSINOPHIL NFR BLD: 0 % (ref 0.5–7.8)
ERYTHROCYTE [DISTWIDTH] IN BLOOD BY AUTOMATED COUNT: 14.4 % (ref 11.9–14.6)
GLUCOSE SERPL-MCNC: 82 MG/DL (ref 70–99)
HCT VFR BLD AUTO: 38.4 % (ref 35.8–46.3)
HGB BLD-MCNC: 11.9 G/DL (ref 11.7–15.4)
IMM GRANULOCYTES # BLD AUTO: 0 K/UL (ref 0–0.5)
IMM GRANULOCYTES NFR BLD AUTO: 0 % (ref 0–5)
LYMPHOCYTES # BLD: 2.9 K/UL (ref 0.5–4.6)
LYMPHOCYTES NFR BLD: 32 % (ref 13–44)
MCH RBC QN AUTO: 28.5 PG (ref 26.1–32.9)
MCHC RBC AUTO-ENTMCNC: 31 G/DL (ref 31.4–35)
MCV RBC AUTO: 91.9 FL (ref 82–102)
MONOCYTES # BLD: 0.4 K/UL (ref 0.1–1.3)
MONOCYTES NFR BLD: 5 % (ref 4–12)
NEUTS SEG # BLD: 5.7 K/UL (ref 1.7–8.2)
NEUTS SEG NFR BLD: 63 % (ref 43–78)
NRBC # BLD: 0 K/UL (ref 0–0.2)
PLATELET # BLD AUTO: 352 K/UL (ref 150–450)
PMV BLD AUTO: 11.2 FL (ref 9.4–12.3)
POTASSIUM SERPL-SCNC: 4.4 MMOL/L (ref 3.5–5.1)
RBC # BLD AUTO: 4.18 M/UL (ref 4.05–5.2)
SODIUM SERPL-SCNC: 141 MMOL/L (ref 136–145)
WBC # BLD AUTO: 9.1 K/UL (ref 4.3–11.1)

## 2024-09-06 PROCEDURE — 85025 COMPLETE CBC W/AUTO DIFF WBC: CPT

## 2024-09-06 PROCEDURE — 6370000000 HC RX 637 (ALT 250 FOR IP): Performed by: PHYSICIAN ASSISTANT

## 2024-09-06 PROCEDURE — G0378 HOSPITAL OBSERVATION PER HR: HCPCS

## 2024-09-06 PROCEDURE — 6370000000 HC RX 637 (ALT 250 FOR IP): Performed by: SURGERY

## 2024-09-06 PROCEDURE — APPSS30 APP SPLIT SHARED TIME 16-30 MINUTES: Performed by: PHYSICIAN ASSISTANT

## 2024-09-06 PROCEDURE — 36415 COLL VENOUS BLD VENIPUNCTURE: CPT

## 2024-09-06 PROCEDURE — 80048 BASIC METABOLIC PNL TOTAL CA: CPT

## 2024-09-06 RX ORDER — EPINEPHRINE 1 MG/ML
0.3 INJECTION, SOLUTION, CONCENTRATE INTRAVENOUS PRN
OUTPATIENT
Start: 2024-09-07

## 2024-09-06 RX ORDER — HEPARIN 100 UNIT/ML
500 SYRINGE INTRAVENOUS PRN
OUTPATIENT
Start: 2024-09-07

## 2024-09-06 RX ORDER — ACETAMINOPHEN 325 MG/1
650 TABLET ORAL
OUTPATIENT
Start: 2024-09-07

## 2024-09-06 RX ORDER — ALBUTEROL SULFATE 90 UG/1
4 AEROSOL, METERED RESPIRATORY (INHALATION) PRN
OUTPATIENT
Start: 2024-09-07

## 2024-09-06 RX ORDER — SODIUM CHLORIDE 9 MG/ML
INJECTION, SOLUTION INTRAVENOUS CONTINUOUS
OUTPATIENT
Start: 2024-09-07

## 2024-09-06 RX ORDER — SODIUM CHLORIDE 0.9 % (FLUSH) 0.9 %
5-40 SYRINGE (ML) INJECTION PRN
OUTPATIENT
Start: 2024-09-07

## 2024-09-06 RX ORDER — DIPHENHYDRAMINE HYDROCHLORIDE 50 MG/ML
50 INJECTION INTRAMUSCULAR; INTRAVENOUS
OUTPATIENT
Start: 2024-09-07

## 2024-09-06 RX ORDER — SODIUM CHLORIDE 9 MG/ML
5-250 INJECTION, SOLUTION INTRAVENOUS PRN
OUTPATIENT
Start: 2024-09-07

## 2024-09-06 RX ORDER — ONDANSETRON 2 MG/ML
8 INJECTION INTRAMUSCULAR; INTRAVENOUS
OUTPATIENT
Start: 2024-09-07

## 2024-09-06 RX ADMIN — FLUOXETINE HYDROCHLORIDE 60 MG: 20 CAPSULE ORAL at 09:12

## 2024-09-06 RX ADMIN — ACETAMINOPHEN 650 MG: 325 TABLET, FILM COATED ORAL at 00:38

## 2024-09-06 RX ADMIN — ACETAMINOPHEN 650 MG: 325 TABLET, FILM COATED ORAL at 05:55

## 2024-09-06 RX ADMIN — SUCRALFATE 1 G: 1 TABLET ORAL at 00:38

## 2024-09-06 RX ADMIN — GABAPENTIN 300 MG: 300 CAPSULE ORAL at 09:07

## 2024-09-06 RX ADMIN — SUCRALFATE 1 G: 1 TABLET ORAL at 05:55

## 2024-09-06 RX ADMIN — AMLODIPINE BESYLATE 5 MG: 5 TABLET ORAL at 09:12

## 2024-09-06 NOTE — PROGRESS NOTES
Virginia Romero PA-C  Bariatric & Advanced Laparoscopic Surgery & Endoscopy    Bariatric Surgery Daily Progress Note    Patient: Anali Strauss  MRN: [id number]  Date: 9/6/2024 8:06 AM  Admit Date: 9/4/2024  Procedure: laparoscopic sleeve gastrectomy    Subjective:     Anali Strauss is a 39 y.o. female who is POD #2 s/p sleeve gastrectomy completed by Dr. Geoffrey Cohn. She reports that she is doing well today and feeling better than yesterday. She admits to mild generalized abdominal pain, and denies nausea or vomiting. She has been OOB ambulating, voiding without difficulty, using the incentive spirometer, and tolerating adequate PO intake when she is not sleeping.     Review of Systems   General ROS: negative for - fever or chills  Psychological ROS: negative for - memory difficulties  Ophthalmic ROS: negative for - blurry vision or double vision  Respiratory ROS: negative for - cough, shortness of breath, or wheezing  Cardiovascular ROS: negative for - chest pain  Gastrointestinal ROS: negative for - nausea, vomiting or dry heaving  Musculoskeletal ROS: negative for - pain in the lower extremities  Neurological ROS: negative for - dizziness, light headedness, headaches or numbness     Objective:     MEDS:    Current Facility-Administered Medications   Medication Dose Route Frequency Provider Last Rate Last Admin    diphenhydrAMINE (BENADRYL) capsule 25 mg  25 mg Oral Q6H PRN Virginia Romero PA        Or    diphenhydrAMINE (BENADRYL) injection 25 mg  25 mg IntraVENous Q6H PRN Virginia Romero PA        0.9% NaCl with KCl 20 mEq infusion   IntraVENous Continuous Virginia Romero PA   Stopped at 09/06/24 0439    sodium chloride flush 0.9 % injection 5-40 mL  5-40 mL IntraVENous 2 times per day Virginia Romero PA   10 mL at 09/05/24 0849    sodium chloride flush 0.9 % injection 5-40 mL  5-40 mL IntraVENous PRN Virginia Romero PA        0.9 % sodium chloride infusion   IntraVENous  No

## 2024-09-06 NOTE — PLAN OF CARE
Problem: Pain  Goal: Verbalizes/displays adequate comfort level or baseline comfort level  9/5/2024 2247 by Leatha Aceves RN  Outcome: Progressing  9/5/2024 1428 by Royce Mac RN  Outcome: Progressing     Problem: Discharge Planning  Goal: Discharge to home or other facility with appropriate resources  9/5/2024 2247 by Leatha Aceves RN  Outcome: Progressing  9/5/2024 1428 by Royce Mac RN  Outcome: Progressing     Problem: ABCDS Injury Assessment  Goal: Absence of physical injury  9/5/2024 2247 by Leatha Aceves RN  Outcome: Progressing  9/5/2024 1428 by Royce Mac, RN  Outcome: Progressing     Problem: Safety - Adult  Goal: Free from fall injury  9/5/2024 2247 by Leatha Aceves RN  Outcome: Progressing  9/5/2024 1428 by Royce Mac, RN  Outcome: Progressing

## 2024-09-06 NOTE — CARE COORDINATION
Patient with discharge orders for today. No additional needs made known to CM. Patient has met all treatment goals and milestones for discharge. Family to provide transportation home. CM following until patient is discharged.        09/06/24 0824   Services At/After Discharge   Transition of Care Consult (CM Consult) N/A   Services At/After Discharge None    Resource Information Provided? No   Mode of Transport at Discharge Other (see comment)  (Family)   Confirm Follow Up Transport Family   Condition of Participation: Discharge Planning   The Plan for Transition of Care is related to the following treatment goals: Patient to return to baseline level of function at home.   The Patient and/or Patient Representative was provided with a Choice of Provider? Patient   The Patient and/Or Patient Representative agree with the Discharge Plan? Yes   Freedom of Choice list was provided with basic dialogue that supports the patient's individualized plan of care/goals, treatment preferences, and shares the quality data associated with the providers?  Yes

## 2024-09-07 NOTE — DISCHARGE SUMMARY
Patient ID:  Anali Strauss  [id number]  39 y.o.  1985    Admission Date: 9/4/2024 11:01 AM  Admitting Provider: Geoffrey Cohn MD  Discharge Date: 9/6/2024    Admission Diagnoses: Morbid obesity (HCC) [E66.01]  Discharge Diagnoses:  Principal Problem:    Morbid obesity (HCC)  Active Problems:    Dehydration  Resolved Problems:    * No resolved hospital problems. *       Hospital Course:   Patient is a 39 y.o. female who underwent laparoscopic sleeve gastrectomy on the date of admission by Dr. Geoffrey Cohn. There were no complications and the patient tolerated the procedure well. She was admitted to the inpatient hospital service post-operatively for an expected 1-2 overnight hospital stay. Following the procedure, the patient was started on a clear liquid and protein diet, given pain and nausea medication, and maintained on continuous IVF, was encouraged to use incentive spirometry and walk frequently. Daily morning labs were drawn to check hemoglobin levels, electrolytes, and kidney function, and vitals were monitored to assess patient status. The patient was maintained on both GI prophylaxis and VTE prophylaxis, and was evaluated by physical therapy. At the time of discharge, patient was doing well without any significant chest pain, difficulty breathing, nausea or vomiting, ambulating, good urine output, pain well controlled, and able to tolerate adequate oral liquid intake.    Consults: None    Drains:  None in place     Significant Diagnostic Studies: labs:   Lab Results   Component Value Date/Time    WBC 9.1 09/06/2024 05:23 AM    HGB 11.9 09/06/2024 05:23 AM    HCT 38.4 09/06/2024 05:23 AM     09/06/2024 05:23 AM    MCV 91.9 09/06/2024 05:23 AM     Lab Results   Component Value Date/Time     09/06/2024 05:23 AM    K 4.4 09/06/2024 05:23 AM     09/06/2024 05:23 AM    CO2 22 09/06/2024 05:23 AM    BUN 8 09/06/2024 05:23 AM    GFRAA >60 03/19/2022 10:38 AM     Radiology:

## 2024-09-12 PROBLEM — E66.09 OBESITY DUE TO EXCESS CALORIES WITHOUT SERIOUS COMORBIDITY: Chronic | Status: ACTIVE | Noted: 2018-10-05

## 2024-09-13 ENCOUNTER — OFFICE VISIT (OUTPATIENT)
Age: 39
End: 2024-09-13

## 2024-09-13 VITALS
HEIGHT: 60 IN | WEIGHT: 293 LBS | HEART RATE: 80 BPM | SYSTOLIC BLOOD PRESSURE: 147 MMHG | BODY MASS INDEX: 57.52 KG/M2 | DIASTOLIC BLOOD PRESSURE: 81 MMHG

## 2024-09-13 DIAGNOSIS — E66.01 OBESITY, MORBID, BMI 50 OR HIGHER (HCC): ICD-10-CM

## 2024-09-13 DIAGNOSIS — Z71.82 EXERCISE COUNSELING: ICD-10-CM

## 2024-09-13 DIAGNOSIS — E66.01 MORBID OBESITY (HCC): Primary | ICD-10-CM

## 2024-09-13 DIAGNOSIS — R11.0 NAUSEA: ICD-10-CM

## 2024-09-13 DIAGNOSIS — Z71.3 DIETARY COUNSELING: ICD-10-CM

## 2024-09-13 DIAGNOSIS — Z98.84 S/P LAPAROSCOPIC SLEEVE GASTRECTOMY: ICD-10-CM

## 2024-09-13 PROCEDURE — 99024 POSTOP FOLLOW-UP VISIT: CPT | Performed by: PHYSICIAN ASSISTANT

## 2024-09-26 ENCOUNTER — OFFICE VISIT (OUTPATIENT)
Dept: SURGERY | Age: 39
End: 2024-09-26

## 2024-09-26 VITALS
HEART RATE: 83 BPM | BODY MASS INDEX: 57.33 KG/M2 | HEIGHT: 60 IN | DIASTOLIC BLOOD PRESSURE: 81 MMHG | WEIGHT: 292 LBS | SYSTOLIC BLOOD PRESSURE: 146 MMHG

## 2024-09-26 DIAGNOSIS — E66.01 MORBID OBESITY: Primary | ICD-10-CM

## 2024-09-26 DIAGNOSIS — Z98.84 S/P LAPAROSCOPIC SLEEVE GASTRECTOMY: ICD-10-CM

## 2024-09-26 DIAGNOSIS — Z48.02 VISIT FOR SUTURE REMOVAL: ICD-10-CM

## 2024-09-26 DIAGNOSIS — Z71.3 DIETARY COUNSELING: ICD-10-CM

## 2024-09-26 DIAGNOSIS — Z71.82 EXERCISE COUNSELING: ICD-10-CM

## 2024-09-26 DIAGNOSIS — E66.01 OBESITY, MORBID, BMI 50 OR HIGHER: ICD-10-CM

## 2024-10-21 ENCOUNTER — OFFICE VISIT (OUTPATIENT)
Dept: SURGERY | Age: 39
End: 2024-10-21

## 2024-10-21 VITALS
DIASTOLIC BLOOD PRESSURE: 75 MMHG | HEIGHT: 60 IN | SYSTOLIC BLOOD PRESSURE: 135 MMHG | BODY MASS INDEX: 55.76 KG/M2 | WEIGHT: 284 LBS | HEART RATE: 77 BPM

## 2024-10-21 DIAGNOSIS — E66.01 MORBID OBESITY: Primary | ICD-10-CM

## 2024-10-21 DIAGNOSIS — Z13.21 ENCOUNTER FOR VITAMIN DEFICIENCY SCREENING: ICD-10-CM

## 2024-10-21 DIAGNOSIS — Z98.84 S/P LAPAROSCOPIC SLEEVE GASTRECTOMY: ICD-10-CM

## 2024-10-21 DIAGNOSIS — E66.01 OBESITY, MORBID, BMI 50 OR HIGHER: ICD-10-CM

## 2024-10-21 DIAGNOSIS — Z71.82 EXERCISE COUNSELING: ICD-10-CM

## 2024-10-21 DIAGNOSIS — R11.11 VOMITING WITHOUT NAUSEA, UNSPECIFIED VOMITING TYPE: ICD-10-CM

## 2024-10-21 DIAGNOSIS — Z71.3 DIETARY COUNSELING: ICD-10-CM

## 2024-10-21 PROCEDURE — 99024 POSTOP FOLLOW-UP VISIT: CPT | Performed by: PHYSICIAN ASSISTANT

## 2024-10-21 NOTE — PROGRESS NOTES
Virginia Romero PA-C  Bariatric & Advanced Laparoscopic Surgery & Endoscopy      Date of visit: 10/21/2024          Name: Anali Strauss      MRN: [id number]       : 1985       Sex: female  PCP: Lydia Love PA     Surgeon: Dr. Geoffrey Cohn  Procedure: laparoscopic sleeve gastrectomy     Surgeon: Lalit   DOS: 2024   Procedure: Sleeve   Pre-op weight: 314   Ideal body weight: 132   Excess body weight: 182      Weight History Graph  Last Surgical Weight Loss:      9/10/2024    10:55 AM 2024     9:49 AM 2024     9:51 AM 10/21/2024    10:08 AM   Surgical Weight Loss Tracker   Consult Date 2024      Initial Height 5' 1\"      Initial Weight 319 lb      Initial BMI 60.27      Ideal Body Weight 132 lb      Surgery Date 2024      Pre-Surgical Height 5' 1\"      Pre-Surgical Weight 314 lb      Pre Surgery BMI 59.32      Weight to Lose 182 lb      Date  2024 2024 10/21/2024   Height  5' 0\" 5' 0\" 5' 0\"   Weight  300 lb 292 lb 284 lb   BMI  58.58 57.02 55.46   Weight Change  -14 lb -8 lb -8 lb   Total Weight Change  -14 lb -22 lb -30 lb   % EBWL  8% 12% 16%      Subjective   6 weeks post-op visit after a laparoscopic sleeve gastrectomy was done on 2024.   She has lost 8lbs since her last office visit.    Clinical Assessment:    She is doing very well today and is feeling good.  She reports that she has been adhering to the soft diet without difficulty.  She admits to two episodes of vomiting with eating ground beef.  She denies any abdominal pain, nausea or heartburn.  She denies fevers or chills, or any redness or drainage from the incision sites.  She does not report having problems with constipation.  Her pain is well controlled and is not taking pain medications.  She has been taking the PPI without difficulty.    Protein:  60 grams per day  Fluids:    64 ounces per day  Exercise:  walking and treadmill 4-5x per week for 30-45 minutes    Evaluation of

## 2024-10-21 NOTE — PROGRESS NOTES
[facility] NUTRITION REASSESSMENT    Assessment:  Pt is 6 weeks s/p VSG.    Pt is doing well with soft diet compliance.    Intake of ~50g protein/d and ~64oz fluid/d.   Pt is eating cauliflower pizza, chx, and veggies; and drinking water.    Pt is exercising via walking and treadmill for 30-45min 4-5 x weekly.    Pt is taking MVI and Ca supplements as recommended-pt to switch MVI brands.  Pt reports vomited when eating beef and issues with current MVI brand (pt switching).     Intervention:   Discussed food choices and meal ideas on regular diet, once released by surgeon.   Encouraged pt to follow mindful eating behaviors, lean protein focus followed by non-starchy vegetables as able.  Encouraged pt use protein supplements throughout the day as snacks rather than as meal replacement.    Encouraged continued and increased activity.      Monitoring and Evaluation:  Monitor for continued safe, supervised weight loss for VSG.     Monitor pt for meeting protein requirements of 60-80g/d, 64+ fl oz fluids.  Follow for tolerance of regular diet.   F/U per MD Beverly Chowdhury MBA. RD, LD

## 2024-11-30 DIAGNOSIS — K21.9 GASTROESOPHAGEAL REFLUX DISEASE, UNSPECIFIED WHETHER ESOPHAGITIS PRESENT: ICD-10-CM

## 2024-12-02 RX ORDER — OMEPRAZOLE 40 MG/1
40 CAPSULE, DELAYED RELEASE ORAL
Qty: 30 CAPSULE | Refills: 2 | Status: SHIPPED | OUTPATIENT
Start: 2024-12-02

## 2024-12-16 ENCOUNTER — OFFICE VISIT (OUTPATIENT)
Dept: SURGERY | Age: 39
End: 2024-12-16
Payer: MEDICAID

## 2024-12-16 VITALS
HEIGHT: 60 IN | WEIGHT: 267 LBS | DIASTOLIC BLOOD PRESSURE: 83 MMHG | HEART RATE: 61 BPM | SYSTOLIC BLOOD PRESSURE: 169 MMHG | BODY MASS INDEX: 52.42 KG/M2

## 2024-12-16 DIAGNOSIS — E66.01 MORBID OBESITY: Primary | ICD-10-CM

## 2024-12-16 DIAGNOSIS — Z71.82 EXERCISE COUNSELING: ICD-10-CM

## 2024-12-16 DIAGNOSIS — E66.01 MORBID OBESITY: ICD-10-CM

## 2024-12-16 DIAGNOSIS — Z98.84 S/P LAPAROSCOPIC SLEEVE GASTRECTOMY: ICD-10-CM

## 2024-12-16 DIAGNOSIS — Z13.21 ENCOUNTER FOR VITAMIN DEFICIENCY SCREENING: ICD-10-CM

## 2024-12-16 DIAGNOSIS — E66.01 OBESITY, MORBID, BMI 50 OR HIGHER: ICD-10-CM

## 2024-12-16 DIAGNOSIS — Z71.3 NUTRITIONAL COUNSELING: ICD-10-CM

## 2024-12-16 DIAGNOSIS — Z71.3 DIETARY COUNSELING: ICD-10-CM

## 2024-12-16 LAB
ALBUMIN SERPL-MCNC: 3.4 G/DL (ref 3.5–5)
ALBUMIN/GLOB SERPL: 1 (ref 1–1.9)
ALP SERPL-CCNC: 101 U/L (ref 35–104)
ALT SERPL-CCNC: 17 U/L (ref 8–45)
ANION GAP SERPL CALC-SCNC: 11 MMOL/L (ref 7–16)
AST SERPL-CCNC: 24 U/L (ref 15–37)
BASOPHILS # BLD: 0 K/UL (ref 0–0.2)
BASOPHILS NFR BLD: 0 % (ref 0–2)
BILIRUB SERPL-MCNC: 0.3 MG/DL (ref 0–1.2)
BUN SERPL-MCNC: 7 MG/DL (ref 6–23)
CALCIUM SERPL-MCNC: 9.3 MG/DL (ref 8.8–10.2)
CHLORIDE SERPL-SCNC: 107 MMOL/L (ref 98–107)
CO2 SERPL-SCNC: 24 MMOL/L (ref 20–29)
CREAT SERPL-MCNC: 0.68 MG/DL (ref 0.6–1.1)
DIFFERENTIAL METHOD BLD: NORMAL
EOSINOPHIL # BLD: 0.2 K/UL (ref 0–0.8)
EOSINOPHIL NFR BLD: 3 % (ref 0.5–7.8)
ERYTHROCYTE [DISTWIDTH] IN BLOOD BY AUTOMATED COUNT: 13.8 % (ref 11.9–14.6)
FOLATE SERPL-MCNC: 3.4 NG/ML (ref 3.1–17.5)
GLOBULIN SER CALC-MCNC: 3.3 G/DL (ref 2.3–3.5)
GLUCOSE SERPL-MCNC: 88 MG/DL (ref 70–99)
HCT VFR BLD AUTO: 40.5 % (ref 35.8–46.3)
HGB BLD-MCNC: 12.9 G/DL (ref 11.7–15.4)
IMM GRANULOCYTES # BLD AUTO: 0 K/UL (ref 0–0.5)
IMM GRANULOCYTES NFR BLD AUTO: 0 % (ref 0–5)
IRON SERPL-MCNC: 51 UG/DL (ref 35–100)
LYMPHOCYTES # BLD: 2.5 K/UL (ref 0.5–4.6)
LYMPHOCYTES NFR BLD: 29 % (ref 13–44)
MCH RBC QN AUTO: 28.9 PG (ref 26.1–32.9)
MCHC RBC AUTO-ENTMCNC: 31.9 G/DL (ref 31.4–35)
MCV RBC AUTO: 90.6 FL (ref 82–102)
MONOCYTES # BLD: 0.6 K/UL (ref 0.1–1.3)
MONOCYTES NFR BLD: 7 % (ref 4–12)
NEUTS SEG # BLD: 5.1 K/UL (ref 1.7–8.2)
NEUTS SEG NFR BLD: 61 % (ref 43–78)
NRBC # BLD: 0 K/UL (ref 0–0.2)
PLATELET # BLD AUTO: 371 K/UL (ref 150–450)
PMV BLD AUTO: 12 FL (ref 9.4–12.3)
POTASSIUM SERPL-SCNC: 4.1 MMOL/L (ref 3.5–5.1)
PROT SERPL-MCNC: 6.7 G/DL (ref 6.3–8.2)
RBC # BLD AUTO: 4.47 M/UL (ref 4.05–5.2)
SODIUM SERPL-SCNC: 142 MMOL/L (ref 136–145)
WBC # BLD AUTO: 8.4 K/UL (ref 4.3–11.1)

## 2024-12-16 PROCEDURE — 3077F SYST BP >= 140 MM HG: CPT | Performed by: SURGERY

## 2024-12-16 PROCEDURE — 3079F DIAST BP 80-89 MM HG: CPT | Performed by: SURGERY

## 2024-12-16 PROCEDURE — 99213 OFFICE O/P EST LOW 20 MIN: CPT | Performed by: SURGERY

## 2024-12-16 PROCEDURE — APPSS30 APP SPLIT SHARED TIME 16-30 MINUTES: Performed by: PHYSICIAN ASSISTANT

## 2024-12-16 NOTE — PROGRESS NOTES
[facility] NUTRITION REASSESSMENT  Assessment:   3 months post-op VSG. Pt consuming ~40g protein/d and ~60oz fluid/d. Pt is eating at least 3x/d. Pt is drinking water and SF drinks. Pt is exercising via walking, stretching, and gym for 30min 3-5 x weekly. Pt is not taking MVI and Ca supplements as recommended - pt has tried multiple kinds and all of them cause nausea and headaches - discussed patch MVI.    Diet Recall:   Breakfast: Eggs on wheat   Lunch: Crustless pizza   Dinner: Chx taco     Intervention:   Evaluated diet recall and identified modifications.  Encouraged intake of salad like vegetables  Encouraged continued activity.        Monitoring and Evaluation:  Monitor for continued safe, supervised weight loss for VSG.   F/U per MD Beverly Chowdhury MBA. RD, LD   
    Discussed the patient's BMI with her.  The BMI follow up plan is as follows:    Plan:  She is doing well without any major concerns.  She will continue a protein first diet, all questions were answered.  She will increase the current exercise routine.  She will continue the appropriate vitamins and supplements.  Return to the office in 3 months or sooner.     Time: I spent 20 minutes preparing to see patient (including chart review and preparation), obtaining and/or reviewing additional medical history, performing a physical exam and evaluation, documenting clinical information in the electronic health record, independently interpreting results, communicating results to patient, family or caregiver, and/or coordinating care.          Geoffrey Cohn MD  12/16/2024

## 2024-12-20 LAB — VIT B1 BLD-SCNC: 106.9 NMOL/L (ref 66.5–200)

## 2025-02-28 NOTE — PROGRESS NOTES
MICKEY KAPOOR MD    [facility]prehensive [facility] Surgical Weight Loss    Name: Anali Strauss      MRN: [id number]       : 1985       Sex: female  PCP: Lydia Love PA     Surgeon: Dr. Mickey Kapoor  Procedure: laparoscopic sleeve gastrectomy    Surgeon: Lalit Higgins   DOS:    Procedure:    Pre-op weight:    Ideal body weight:    Excess body weight:      Weight History Graph  Last Surgical Weight Loss:      9/10/2024    10:55 AM 2024     9:49 AM 2024     9:51 AM 10/21/2024    10:08 AM 2024     2:02 PM 3/3/2025    12:56 PM   Surgical Weight Loss Tracker   Consult Date 2024        Initial Height 5' 1\"        Initial Weight 319 lb        Initial BMI 60.27        Ideal Body Weight 132 lb        Surgery Date 2024        Pre-Surgical Height 5' 1\"        Pre-Surgical Weight 314 lb        Pre Surgery BMI 59.32        Weight to Lose 182 lb        Date  2024 2024 10/21/2024 2024 3/3/2025   Height  5' 0\" 5' 0\" 5' 0\" 5' 0\" 5' 0\"   Weight  300 lb 292 lb 284 lb 267 lb 246 lb   BMI  58.58 57.02 55.46 52.14 48.04   Weight Change  -14 lb -8 lb -8 lb -17 lb -21 lb   Total Weight Change  -14 lb -22 lb -30 lb -47 lb -68 lb   % EBWL  8% 12% 16% 26% 37%     Subjective   Six month post-op visit after a laparoscopic sleeve gastrectomy was done on 24.   She has lost 21 lbs since her last office visit.    Clinical Assessment:    She is doing very well today and is feeling good.  She reports that she has been adhering to a regular diet without difficulty.  She denies any  abdominal pain, nausea or vomiting. She does have GERD.  She denies fevers, chills, or any problems with the incision sites.  She does report having problems with constipation as she does not drink enough liquids.     Protein:  40 grams per day  Fluids:    3 ounces per day  Exercise:  Walks 4 days per week for 30 minutes.      Evaluation of Pre-operative Co-morbid Conditions:                GERD yes, treatment

## 2025-03-03 ENCOUNTER — OFFICE VISIT (OUTPATIENT)
Dept: SURGERY | Age: 40
End: 2025-03-03
Payer: MEDICAID

## 2025-03-03 VITALS
HEART RATE: 90 BPM | BODY MASS INDEX: 48.29 KG/M2 | WEIGHT: 246 LBS | SYSTOLIC BLOOD PRESSURE: 134 MMHG | HEIGHT: 60 IN | DIASTOLIC BLOOD PRESSURE: 75 MMHG

## 2025-03-03 DIAGNOSIS — Z98.84 S/P LAPAROSCOPIC SLEEVE GASTRECTOMY: ICD-10-CM

## 2025-03-03 DIAGNOSIS — E66.01 MORBID OBESITY: Primary | ICD-10-CM

## 2025-03-03 PROCEDURE — 3078F DIAST BP <80 MM HG: CPT | Performed by: SURGERY

## 2025-03-03 PROCEDURE — 3075F SYST BP GE 130 - 139MM HG: CPT | Performed by: SURGERY

## 2025-03-03 PROCEDURE — 99213 OFFICE O/P EST LOW 20 MIN: CPT | Performed by: SURGERY

## 2025-03-17 DIAGNOSIS — K21.9 GASTROESOPHAGEAL REFLUX DISEASE, UNSPECIFIED WHETHER ESOPHAGITIS PRESENT: ICD-10-CM

## 2025-03-17 RX ORDER — OMEPRAZOLE 40 MG/1
40 CAPSULE, DELAYED RELEASE ORAL
Qty: 30 CAPSULE | Refills: 2 | Status: SHIPPED | OUTPATIENT
Start: 2025-03-17

## 2025-06-12 ENCOUNTER — TELEMEDICINE (OUTPATIENT)
Dept: SURGERY | Age: 40
End: 2025-06-12

## 2025-06-12 DIAGNOSIS — Z71.3 NUTRITIONAL COUNSELING: Primary | ICD-10-CM

## 2025-06-13 NOTE — PROGRESS NOTES
Pt reached out as she feels like she is at a wt stall. Pt reports exercising at least 5 x weekly including cardio and wt bearing exercises.     Diet recall:  Breakfast: Protein shake  Lunch: Balance pack  Dinner: Ground beef, brussells, corn    Encouraged pt to fully track 2-3 days worth of eating to gain a better idea of calorie intake - pt also reports wearing an Apple Watch while exercising - this will provide approximate calories burned during exercise. Will follow up with patient in 1 week to evaluate food and activity log to determine calorie intake vs calories burned.

## 2025-06-18 ENCOUNTER — TELEMEDICINE (OUTPATIENT)
Dept: SURGERY | Age: 40
End: 2025-06-18

## 2025-06-18 DIAGNOSIS — Z71.3 NUTRITIONAL COUNSELING: Primary | ICD-10-CM

## 2025-06-19 NOTE — PROGRESS NOTES
Follow up with pt and pt reports monitoring calorie intake, but has reduced exercise due to injury. Pt reports losing 4# in the last month - pt reports she will call me as needed.

## 2025-07-07 DIAGNOSIS — K21.9 GASTROESOPHAGEAL REFLUX DISEASE, UNSPECIFIED WHETHER ESOPHAGITIS PRESENT: ICD-10-CM

## 2025-07-07 RX ORDER — OMEPRAZOLE 40 MG/1
40 CAPSULE, DELAYED RELEASE ORAL
Qty: 30 CAPSULE | Refills: 2 | Status: SHIPPED | OUTPATIENT
Start: 2025-07-07

## (undated) DEVICE — GARMENT,MEDLINE,DVT,INT,CALF,LG, GEN2: Brand: MEDLINE

## (undated) DEVICE — SEALANT TISS 4 CC FIBRIN VISTASEAL

## (undated) DEVICE — SHEARS ENDOSCP L36CM DIA5MM ULTRASONIC CRV TIP W/ ADV

## (undated) DEVICE — PAD MATERNITY 11IN W/TAILS -- STRL

## (undated) DEVICE — PAD,NON-ADHERENT,3X8,STERILE,LF,1/PK: Brand: MEDLINE

## (undated) DEVICE — LAPAROSCOPIC TROCAR SLEEVE/SINGLE USE: Brand: KII® OPTICAL ACCESS SYSTEM

## (undated) DEVICE — CARDINAL HEALTH FLEXIBLE LIGHT HANDLE COVER: Brand: CARDINAL HEALTH

## (undated) DEVICE — TROCAR: Brand: KII® OPTICAL ACCESS SYSTEM

## (undated) DEVICE — LIQUIBAND RAPID ADHESIVE 36/CS 0.8ML: Brand: MEDLINE

## (undated) DEVICE — VISIGI 3D®  CALIBRATION SYSTEM  SIZE 40FR SLEEVE/STD: Brand: BOEHRINGER® VISIGI™ 3D SLEEVE GASTRECTOMY CALIBRATION SYSTEM, SIZE 40FR

## (undated) DEVICE — 40585 XL ADVANCED TRENDELENBURG POSITIONING KIT: Brand: 40585 XL ADVANCED TRENDELENBURG POSITIONING KIT

## (undated) DEVICE — REM POLYHESIVE ADULT PATIENT RETURN ELECTRODE: Brand: VALLEYLAB

## (undated) DEVICE — GASTRIC SLEEVE: Brand: MEDLINE INDUSTRIES, INC.

## (undated) DEVICE — Z DUPLICATE USE 2847003 INJECTOR UTER

## (undated) DEVICE — ELECTRODE LAPROSCOPIC 13 1 2IN SPAT 6 BX

## (undated) DEVICE — SUT VCRL + 0 36IN CT1 UD --

## (undated) DEVICE — TROCAR: Brand: KII FIOS FIRST ENTRY

## (undated) DEVICE — APPLICATOR LAP 35 CM 2 RIGID VISTASEAL

## (undated) DEVICE — 2000CC GUARDIAN II: Brand: GUARDIAN

## (undated) DEVICE — APPLICATOR MEDICATED 26 CC SOLUTION HI LT ORNG CHLORAPREP

## (undated) DEVICE — DRAPE TWL SURG 16X26IN BLU ORB04] ALLCARE INC]

## (undated) DEVICE — GYN LAP PK

## (undated) DEVICE — RELOAD STPL H4.1X2MM DIA60MM THCK TISS GRN 6 ROW PWR GST B

## (undated) DEVICE — SUTURE MONOCRYL SZ 4-0 L27IN ABSRB UD L19MM PS-2 1/2 CIR PRIM Y426H

## (undated) DEVICE — STAPLER SKIN SQ 30 ABSRB STPL DISP INSORB

## (undated) DEVICE — COVER,MAYO STAND,STERILE: Brand: MEDLINE

## (undated) DEVICE — PREP SKN CHLRAPRP APL 26ML STR --

## (undated) DEVICE — SUTURE MONOCRYL + SZ 4-0 L27IN ABSRB UD L19MM PS-2 3/8 CIR MCP426H

## (undated) DEVICE — SUTURE PLN GUT SZ 3-0 L27IN ABSRB YELLOWISH TAN L40MM CT 852H

## (undated) DEVICE — AGENT HEMOSTATIC SURG NU-KNIT ABS 3X4IN WOVEN KNIT 12/BX

## (undated) DEVICE — TUBING, SUCTION, 1/4" X 10', STRAIGHT: Brand: MEDLINE

## (undated) DEVICE — PUMP SUC IRR TBNG L10FT W/ HNDPC ASSEMB STRYKEFLOW 2

## (undated) DEVICE — DERMABOND SKIN ADH 0.7ML -- DERMABOND ADVANCED 12/BX

## (undated) DEVICE — YANKAUER,BULB TIP,W/O VENT,RIGID,STERILE: Brand: MEDLINE

## (undated) DEVICE — CONTAINER SPEC FRMLN 120ML --

## (undated) DEVICE — TISSUE RETRIEVAL SYSTEM: Brand: INZII RETRIEVAL SYSTEM

## (undated) DEVICE — SPONGE GZ W4XL4IN COT 12 PLY TYP VII WVN C FLD DSGN

## (undated) DEVICE — BAG SPEC REM 224ML W4XL6IN DIA10MM 1 HND GYN DISP ENDOPCH

## (undated) DEVICE — STAPLER SKIN L440MM 32MM LNG 12 FIRING B FRM PWR + GRIPPING

## (undated) DEVICE — SPONGE LAP 18X18IN STRL -- 5/PK

## (undated) DEVICE — SUTURE VICRYL + SZ 0 L27IN ABSRB VLT L26MM UR-6 5/8 CIR VCP603H

## (undated) DEVICE — INTENDED FOR TISSUE SEPARATION, AND OTHER PROCEDURES THAT REQUIRE A SHARP SURGICAL BLADE TO PUNCTURE OR CUT.: Brand: BARD-PARKER ® STAINLESS STEEL BLADES

## (undated) DEVICE — TUBING INSUFFLATION SMK EVAC HI FLO SET PNEUMOCLEAR

## (undated) DEVICE — BUTTON SWITCH PENCIL BLADE ELECTRODE, HOLSTER: Brand: EDGE

## (undated) DEVICE — KIT,ANTI FOG,W/SPONGE & FLUID,SOFT PACK: Brand: MEDLINE

## (undated) DEVICE — SOLUTION IRRIG 1000ML 0.9% SOD CHL USP POUR PLAS BTL

## (undated) DEVICE — SHEARS ENDOSCP L36CM DIA5MM ULTRASONIC CRV TIP HARM

## (undated) DEVICE — SEALER TISS L45CM DIA5MM ARTC ADV BPLR STR TIP LAP APPRCH

## (undated) DEVICE — APPLIER CLP L SHFT DIA12MM 20 ROT MULT LIGACLP

## (undated) DEVICE — DRAPE SHT 3 QTR PROXIMA 53X77 --

## (undated) DEVICE — PAD,ABDOMINAL,5"X9",ST,LF,25/BX: Brand: MEDLINE INDUSTRIES, INC.

## (undated) DEVICE — [HIGH FLOW INSUFFLATOR,  DO NOT USE IF PACKAGE IS DAMAGED,  KEEP DRY,  KEEP AWAY FROM SUNLIGHT,  PROTECT FROM HEAT AND RADIOACTIVE SOURCES.]: Brand: PNEUMOSURE

## (undated) DEVICE — TROCAR: Brand: KII® SLEEVE

## (undated) DEVICE — TRAY PREP DRY W/ PREM GLV 2 APPL 6 SPNG 2 UNDPD 1 OVERWRAP

## (undated) DEVICE — CANISTER, RIGID, 2000CC: Brand: MEDLINE INDUSTRIES, INC.

## (undated) DEVICE — CLEANER,CAUTERY TIP,2X2",STERILE: Brand: MEDLINE

## (undated) DEVICE — SUTURE MCRYL SZ 4-0 L27IN ABSRB UD L19MM PS-2 1/2 CIR PRIM Y426H

## (undated) DEVICE — SOLUTION IV 1000ML 0.9% SOD CHL

## (undated) DEVICE — JELLY LUBRICATING 10GM PREFIL SYR LUBE

## (undated) DEVICE — RELOAD STPL L60MM H1.5-3.6MM REG TISS BLU GRIPPING SURF B

## (undated) DEVICE — SUTURE ETHIBOND EXCEL SZ 0 L30IN NONABSORBABLE GRN L26MM SH X834H